# Patient Record
Sex: MALE | Race: WHITE | NOT HISPANIC OR LATINO
[De-identification: names, ages, dates, MRNs, and addresses within clinical notes are randomized per-mention and may not be internally consistent; named-entity substitution may affect disease eponyms.]

---

## 2020-04-25 ENCOUNTER — MESSAGE (OUTPATIENT)
Age: 55
End: 2020-04-25

## 2020-05-01 LAB
SARS-COV-2 IGG SERPL IA-ACNC: 1.1 INDEX
SARS-COV-2 IGG SERPL QL IA: POSITIVE

## 2020-09-23 ENCOUNTER — TRANSCRIPTION ENCOUNTER (OUTPATIENT)
Age: 55
End: 2020-09-23

## 2020-12-04 ENCOUNTER — OUTPATIENT (OUTPATIENT)
Dept: OUTPATIENT SERVICES | Facility: HOSPITAL | Age: 55
LOS: 1 days | End: 2020-12-04
Payer: COMMERCIAL

## 2020-12-04 DIAGNOSIS — Z11.59 ENCOUNTER FOR SCREENING FOR OTHER VIRAL DISEASES: ICD-10-CM

## 2020-12-04 LAB — SARS-COV-2 RNA SPEC QL NAA+PROBE: SIGNIFICANT CHANGE UP

## 2020-12-04 PROCEDURE — U0003: CPT

## 2020-12-05 DIAGNOSIS — Z11.59 ENCOUNTER FOR SCREENING FOR OTHER VIRAL DISEASES: ICD-10-CM

## 2021-06-29 ENCOUNTER — OUTPATIENT (OUTPATIENT)
Dept: OUTPATIENT SERVICES | Facility: HOSPITAL | Age: 56
LOS: 1 days | Discharge: HOME | End: 2021-06-29
Payer: COMMERCIAL

## 2021-06-29 ENCOUNTER — RESULT REVIEW (OUTPATIENT)
Age: 56
End: 2021-06-29

## 2021-06-29 VITALS
WEIGHT: 315 LBS | TEMPERATURE: 97 F | HEIGHT: 71 IN | HEART RATE: 76 BPM | DIASTOLIC BLOOD PRESSURE: 77 MMHG | OXYGEN SATURATION: 100 % | SYSTOLIC BLOOD PRESSURE: 138 MMHG | RESPIRATION RATE: 20 BRPM

## 2021-06-29 DIAGNOSIS — Z86.010 PERSONAL HISTORY OF COLONIC POLYPS: ICD-10-CM

## 2021-06-29 PROCEDURE — 88305 TISSUE EXAM BY PATHOLOGIST: CPT | Mod: 26

## 2021-06-29 PROCEDURE — 88305 TISSUE EXAM BY PATHOLOGIST: CPT

## 2021-07-05 DIAGNOSIS — D12.2 BENIGN NEOPLASM OF ASCENDING COLON: ICD-10-CM

## 2021-07-05 DIAGNOSIS — Z86.010 PERSONAL HISTORY OF COLONIC POLYPS: ICD-10-CM

## 2021-07-05 DIAGNOSIS — Z12.11 ENCOUNTER FOR SCREENING FOR MALIGNANT NEOPLASM OF COLON: ICD-10-CM

## 2021-07-05 DIAGNOSIS — E66.01 MORBID (SEVERE) OBESITY DUE TO EXCESS CALORIES: ICD-10-CM

## 2021-07-05 DIAGNOSIS — Z80.0 FAMILY HISTORY OF MALIGNANT NEOPLASM OF DIGESTIVE ORGANS: ICD-10-CM

## 2021-12-24 ENCOUNTER — TRANSCRIPTION ENCOUNTER (OUTPATIENT)
Age: 56
End: 2021-12-24

## 2022-01-12 ENCOUNTER — TRANSCRIPTION ENCOUNTER (OUTPATIENT)
Age: 57
End: 2022-01-12

## 2023-11-06 ENCOUNTER — OFFICE (OUTPATIENT)
Dept: URBAN - METROPOLITAN AREA CLINIC 102 | Facility: CLINIC | Age: 58
Setting detail: OPHTHALMOLOGY
End: 2023-11-06
Payer: COMMERCIAL

## 2023-11-06 DIAGNOSIS — H25.13: ICD-10-CM

## 2023-11-06 DIAGNOSIS — H40.023: ICD-10-CM

## 2023-11-06 DIAGNOSIS — H43.393: ICD-10-CM

## 2023-11-06 DIAGNOSIS — H17.9: ICD-10-CM

## 2023-11-06 DIAGNOSIS — H01.001: ICD-10-CM

## 2023-11-06 DIAGNOSIS — H01.002: ICD-10-CM

## 2023-11-06 DIAGNOSIS — H01.005: ICD-10-CM

## 2023-11-06 DIAGNOSIS — H01.004: ICD-10-CM

## 2023-11-06 PROCEDURE — 92004 COMPRE OPH EXAM NEW PT 1/>: CPT | Performed by: OPHTHALMOLOGY

## 2023-11-06 PROCEDURE — 92201 OPSCPY EXTND RTA DRAW UNI/BI: CPT | Performed by: OPHTHALMOLOGY

## 2023-11-06 PROCEDURE — 92133 CPTRZD OPH DX IMG PST SGM ON: CPT | Performed by: OPHTHALMOLOGY

## 2023-11-06 ASSESSMENT — REFRACTION_AUTOREFRACTION
OD_AXIS: 045
OD_CYLINDER: -0.50
OD_SPHERE: -1.25
OS_CYLINDER: -0.25
OS_AXIS: 156
OS_SPHERE: +0.75

## 2023-11-06 ASSESSMENT — LID EXAM ASSESSMENTS
OD_BLEPHARITIS: T
OS_BLEPHARITIS: T

## 2023-11-06 ASSESSMENT — CONFRONTATIONAL VISUAL FIELD TEST (CVF)
OS_FINDINGS: FULL
OD_FINDINGS: FULL

## 2023-11-06 ASSESSMENT — CORNEAL SURGICAL SCARRING: OS_SCARRING: MID

## 2023-11-06 ASSESSMENT — SPHEQUIV_DERIVED
OS_SPHEQUIV: 0.625
OD_SPHEQUIV: -1.5

## 2023-12-11 ENCOUNTER — OFFICE (OUTPATIENT)
Dept: URBAN - METROPOLITAN AREA CLINIC 109 | Facility: CLINIC | Age: 58
Setting detail: OPHTHALMOLOGY
End: 2023-12-11
Payer: COMMERCIAL

## 2023-12-11 DIAGNOSIS — H43.393: ICD-10-CM

## 2023-12-11 DIAGNOSIS — H01.004: ICD-10-CM

## 2023-12-11 DIAGNOSIS — H40.023: ICD-10-CM

## 2023-12-11 DIAGNOSIS — H17.9: ICD-10-CM

## 2023-12-11 DIAGNOSIS — H01.002: ICD-10-CM

## 2023-12-11 DIAGNOSIS — H25.13: ICD-10-CM

## 2023-12-11 DIAGNOSIS — H01.001: ICD-10-CM

## 2023-12-11 DIAGNOSIS — H01.005: ICD-10-CM

## 2023-12-11 PROCEDURE — 92012 INTRM OPH EXAM EST PATIENT: CPT | Performed by: OPHTHALMOLOGY

## 2023-12-11 PROCEDURE — 76514 ECHO EXAM OF EYE THICKNESS: CPT | Performed by: OPHTHALMOLOGY

## 2023-12-11 PROCEDURE — 92020 GONIOSCOPY: CPT | Performed by: OPHTHALMOLOGY

## 2023-12-11 PROCEDURE — 92250 FUNDUS PHOTOGRAPHY W/I&R: CPT | Performed by: OPHTHALMOLOGY

## 2023-12-11 PROCEDURE — 92083 EXTENDED VISUAL FIELD XM: CPT | Performed by: OPHTHALMOLOGY

## 2023-12-11 ASSESSMENT — CORNEAL SURGICAL SCARRING: OS_SCARRING: MID

## 2023-12-11 ASSESSMENT — CONFRONTATIONAL VISUAL FIELD TEST (CVF)
OS_FINDINGS: FULL
OD_FINDINGS: FULL

## 2023-12-11 ASSESSMENT — SPHEQUIV_DERIVED
OD_SPHEQUIV: -1.375
OS_SPHEQUIV: 0.625

## 2023-12-11 ASSESSMENT — LID EXAM ASSESSMENTS
OD_BLEPHARITIS: T
OS_BLEPHARITIS: T

## 2023-12-11 ASSESSMENT — REFRACTION_AUTOREFRACTION
OS_SPHERE: +0.75
OS_AXIS: 150
OS_CYLINDER: -0.25
OD_CYLINDER: -0.25
OD_AXIS: 15
OD_SPHERE: -1.25

## 2023-12-21 ENCOUNTER — TRANSCRIPTION ENCOUNTER (OUTPATIENT)
Age: 58
End: 2023-12-21

## 2024-01-23 ENCOUNTER — TRANSCRIPTION ENCOUNTER (OUTPATIENT)
Age: 59
End: 2024-01-23

## 2024-02-23 ENCOUNTER — TRANSCRIPTION ENCOUNTER (OUTPATIENT)
Age: 59
End: 2024-02-23

## 2024-03-25 ENCOUNTER — TRANSCRIPTION ENCOUNTER (OUTPATIENT)
Age: 59
End: 2024-03-25

## 2024-04-25 ENCOUNTER — TRANSCRIPTION ENCOUNTER (OUTPATIENT)
Age: 59
End: 2024-04-25

## 2024-05-28 ENCOUNTER — TRANSCRIPTION ENCOUNTER (OUTPATIENT)
Age: 59
End: 2024-05-28

## 2024-07-02 ENCOUNTER — TRANSCRIPTION ENCOUNTER (OUTPATIENT)
Age: 59
End: 2024-07-02

## 2024-07-15 ENCOUNTER — OFFICE (OUTPATIENT)
Dept: URBAN - METROPOLITAN AREA CLINIC 102 | Facility: CLINIC | Age: 59
Setting detail: OPHTHALMOLOGY
End: 2024-07-15
Payer: COMMERCIAL

## 2024-07-15 DIAGNOSIS — H01.005: ICD-10-CM

## 2024-07-15 DIAGNOSIS — H52.4: ICD-10-CM

## 2024-07-15 DIAGNOSIS — H43.393: ICD-10-CM

## 2024-07-15 DIAGNOSIS — H17.9: ICD-10-CM

## 2024-07-15 DIAGNOSIS — H25.13: ICD-10-CM

## 2024-07-15 DIAGNOSIS — H01.004: ICD-10-CM

## 2024-07-15 DIAGNOSIS — H01.001: ICD-10-CM

## 2024-07-15 DIAGNOSIS — H01.002: ICD-10-CM

## 2024-07-15 DIAGNOSIS — H40.023: ICD-10-CM

## 2024-07-15 PROBLEM — H52.7 REFRACTIVE ERROR: Status: ACTIVE | Noted: 2024-07-15

## 2024-07-15 PROCEDURE — 92014 COMPRE OPH EXAM EST PT 1/>: CPT | Performed by: OPHTHALMOLOGY

## 2024-07-15 PROCEDURE — 92083 EXTENDED VISUAL FIELD XM: CPT | Performed by: OPHTHALMOLOGY

## 2024-07-15 PROCEDURE — 92133 CPTRZD OPH DX IMG PST SGM ON: CPT | Performed by: OPHTHALMOLOGY

## 2024-07-15 PROCEDURE — 92015 DETERMINE REFRACTIVE STATE: CPT | Performed by: OPHTHALMOLOGY

## 2024-07-15 ASSESSMENT — LID EXAM ASSESSMENTS
OD_BLEPHARITIS: RLL RUL T
OS_BLEPHARITIS: LLL LUL T

## 2024-07-15 ASSESSMENT — CONFRONTATIONAL VISUAL FIELD TEST (CVF)
OD_FINDINGS: FULL
OS_FINDINGS: FULL

## 2024-08-23 ENCOUNTER — INPATIENT (INPATIENT)
Facility: HOSPITAL | Age: 59
LOS: 0 days | Discharge: HOME | DRG: 712 | End: 2024-08-24
Attending: STUDENT IN AN ORGANIZED HEALTH CARE EDUCATION/TRAINING PROGRAM | Admitting: STUDENT IN AN ORGANIZED HEALTH CARE EDUCATION/TRAINING PROGRAM
Payer: COMMERCIAL

## 2024-08-23 VITALS — WEIGHT: 309.97 LBS | HEIGHT: 70 IN

## 2024-08-23 PROCEDURE — 99053 MED SERV 10PM-8AM 24 HR FAC: CPT

## 2024-08-23 PROCEDURE — 99285 EMERGENCY DEPT VISIT HI MDM: CPT

## 2024-08-23 RX ORDER — SODIUM CHLORIDE 9 MG/ML
1000 INJECTION INTRAMUSCULAR; INTRAVENOUS; SUBCUTANEOUS ONCE
Refills: 0 | Status: COMPLETED | OUTPATIENT
Start: 2024-08-23 | End: 2024-08-23

## 2024-08-23 RX ADMIN — SODIUM CHLORIDE 1000 MILLILITER(S): 9 INJECTION INTRAMUSCULAR; INTRAVENOUS; SUBCUTANEOUS at 23:59

## 2024-08-23 NOTE — ED PROVIDER NOTE - OBJECTIVE STATEMENT
Pt is a 58y male w/ a h/o colon polyps who presents to the ED c/o R sided testicular pain. Patient states he woke up this AM around 2 to 3am from jabbing testicular pain radiating up the groin. Notes that he reported to work but the pain was slowly worsening throughout the day. Took Advil w/ initial improvement of symptoms, but then worsened again spurring a visit to the ED. Is sexually active w/ one partner, denies h/o STDs. Denies trauma or aggravating activity. Denies urinary symptoms. NKA.

## 2024-08-23 NOTE — ED PROVIDER NOTE - PHYSICAL EXAMINATION
GENERAL: A&Ox4, non-toxic appearing, no acute distress  HEENT: NCAT, EOMI, oral mucosa moist, normal conjunctiva  RESP: no respiratory distress, speaking in full sentences  CV: RRR  MSK: no visible deformities  : R testicular TTP w/o significant swelling. Normal cremasteric reflex. No penile discharge.  NEURO: no focal sensory or motor deficits, CN 2-12 grossly intact  SKIN: warm, normal color, well perfused, no rash  PSYCH: normal affect

## 2024-08-23 NOTE — ED PROVIDER NOTE - PROGRESS NOTE DETAILS
Called by radiology, patient with lack of blood flow to the right testicle concerning for testicular torsion.   paged urology PA, discussed these findings with parent.  Dr. Dillard on-call for urology, paged through his service and awaiting callback. Patient accepted for admission under urology service.  Additional preop labs, chest x-ray, and EKG ordered.

## 2024-08-23 NOTE — ED PROVIDER NOTE - CLINICAL SUMMARY MEDICAL DECISION MAKING FREE TEXT BOX
58y p/w gradual onset, atraumatic R testicular pain. DDx includes but is not limited to; epididymitis vs UTI, less likely torsion. Plan for labs, urine, and US.

## 2024-08-23 NOTE — ED ADULT TRIAGE NOTE - CHIEF COMPLAINT QUOTE
Pt presents complaining of R sided testicular pain onset x12hrs. States pain is 4/10 during time of triage. Denies urinary symptoms, is sexually active with one partner. Denies allergies.

## 2024-08-24 ENCOUNTER — TRANSCRIPTION ENCOUNTER (OUTPATIENT)
Age: 59
End: 2024-08-24

## 2024-08-24 VITALS — OXYGEN SATURATION: 97 % | DIASTOLIC BLOOD PRESSURE: 54 MMHG | SYSTOLIC BLOOD PRESSURE: 110 MMHG

## 2024-08-24 DIAGNOSIS — N44.00 TORSION OF TESTIS, UNSPECIFIED: ICD-10-CM

## 2024-08-24 PROBLEM — K63.5 POLYP OF COLON: Chronic | Status: ACTIVE | Noted: 2021-06-29

## 2024-08-24 LAB
ABO RH CONFIRMATION: SIGNIFICANT CHANGE UP
ALBUMIN SERPL ELPH-MCNC: 3.5 G/DL — SIGNIFICANT CHANGE UP (ref 3.3–5)
ALP SERPL-CCNC: 60 U/L — SIGNIFICANT CHANGE UP (ref 40–120)
ALT FLD-CCNC: 40 U/L — SIGNIFICANT CHANGE UP (ref 12–78)
ANION GAP SERPL CALC-SCNC: 8 MMOL/L — SIGNIFICANT CHANGE UP (ref 5–17)
APTT BLD: 29.1 SEC — SIGNIFICANT CHANGE UP (ref 24.5–35.6)
AST SERPL-CCNC: 22 U/L — SIGNIFICANT CHANGE UP (ref 15–37)
BASOPHILS # BLD AUTO: 0.04 K/UL — SIGNIFICANT CHANGE UP (ref 0–0.2)
BASOPHILS NFR BLD AUTO: 0.3 % — SIGNIFICANT CHANGE UP (ref 0–2)
BILIRUB SERPL-MCNC: 1 MG/DL — SIGNIFICANT CHANGE UP (ref 0.2–1.2)
BLD GP AB SCN SERPL QL: SIGNIFICANT CHANGE UP
BUN SERPL-MCNC: 14 MG/DL — SIGNIFICANT CHANGE UP (ref 7–23)
CALCIUM SERPL-MCNC: 8.9 MG/DL — SIGNIFICANT CHANGE UP (ref 8.5–10.1)
CHLORIDE SERPL-SCNC: 107 MMOL/L — SIGNIFICANT CHANGE UP (ref 96–108)
CO2 SERPL-SCNC: 25 MMOL/L — SIGNIFICANT CHANGE UP (ref 22–31)
CREAT SERPL-MCNC: 0.92 MG/DL — SIGNIFICANT CHANGE UP (ref 0.5–1.3)
EGFR: 95 ML/MIN/1.73M2 — SIGNIFICANT CHANGE UP
EOSINOPHIL # BLD AUTO: 0.16 K/UL — SIGNIFICANT CHANGE UP (ref 0–0.5)
EOSINOPHIL NFR BLD AUTO: 1.4 % — SIGNIFICANT CHANGE UP (ref 0–6)
GLUCOSE SERPL-MCNC: 99 MG/DL — SIGNIFICANT CHANGE UP (ref 70–99)
HCT VFR BLD CALC: 43.1 % — SIGNIFICANT CHANGE UP (ref 39–50)
HGB BLD-MCNC: 14.1 G/DL — SIGNIFICANT CHANGE UP (ref 13–17)
IMM GRANULOCYTES NFR BLD AUTO: 0.5 % — SIGNIFICANT CHANGE UP (ref 0–0.9)
INR BLD: 1.1 RATIO — SIGNIFICANT CHANGE UP (ref 0.85–1.18)
LYMPHOCYTES # BLD AUTO: 1.17 K/UL — SIGNIFICANT CHANGE UP (ref 1–3.3)
LYMPHOCYTES # BLD AUTO: 10.1 % — LOW (ref 13–44)
MCHC RBC-ENTMCNC: 25.5 PG — LOW (ref 27–34)
MCHC RBC-ENTMCNC: 32.7 GM/DL — SIGNIFICANT CHANGE UP (ref 32–36)
MCV RBC AUTO: 77.8 FL — LOW (ref 80–100)
MONOCYTES # BLD AUTO: 1.28 K/UL — HIGH (ref 0–0.9)
MONOCYTES NFR BLD AUTO: 11.1 % — SIGNIFICANT CHANGE UP (ref 2–14)
NEUTROPHILS # BLD AUTO: 8.85 K/UL — HIGH (ref 1.8–7.4)
NEUTROPHILS NFR BLD AUTO: 76.6 % — SIGNIFICANT CHANGE UP (ref 43–77)
PLATELET # BLD AUTO: 251 K/UL — SIGNIFICANT CHANGE UP (ref 150–400)
POTASSIUM SERPL-MCNC: 3.6 MMOL/L — SIGNIFICANT CHANGE UP (ref 3.5–5.3)
POTASSIUM SERPL-SCNC: 3.6 MMOL/L — SIGNIFICANT CHANGE UP (ref 3.5–5.3)
PROT SERPL-MCNC: 7.6 GM/DL — SIGNIFICANT CHANGE UP (ref 6–8.3)
PROTHROM AB SERPL-ACNC: 12.4 SEC — SIGNIFICANT CHANGE UP (ref 9.5–13)
RBC # BLD: 5.54 M/UL — SIGNIFICANT CHANGE UP (ref 4.2–5.8)
RBC # FLD: 15.3 % — HIGH (ref 10.3–14.5)
SODIUM SERPL-SCNC: 140 MMOL/L — SIGNIFICANT CHANGE UP (ref 135–145)
WBC # BLD: 11.56 K/UL — HIGH (ref 3.8–10.5)
WBC # FLD AUTO: 11.56 K/UL — HIGH (ref 3.8–10.5)

## 2024-08-24 PROCEDURE — 76870 US EXAM SCROTUM: CPT | Mod: 26

## 2024-08-24 PROCEDURE — 36415 COLL VENOUS BLD VENIPUNCTURE: CPT

## 2024-08-24 PROCEDURE — 85610 PROTHROMBIN TIME: CPT

## 2024-08-24 PROCEDURE — 54520 REMOVAL OF TESTIS: CPT | Mod: RT

## 2024-08-24 PROCEDURE — 93010 ELECTROCARDIOGRAM REPORT: CPT

## 2024-08-24 PROCEDURE — 54600 REDUCE TESTIS TORSION: CPT | Mod: RT

## 2024-08-24 PROCEDURE — 88305 TISSUE EXAM BY PATHOLOGIST: CPT

## 2024-08-24 PROCEDURE — 86901 BLOOD TYPING SEROLOGIC RH(D): CPT

## 2024-08-24 PROCEDURE — 85730 THROMBOPLASTIN TIME PARTIAL: CPT

## 2024-08-24 PROCEDURE — 71045 X-RAY EXAM CHEST 1 VIEW: CPT | Mod: 26

## 2024-08-24 PROCEDURE — 93975 VASCULAR STUDY: CPT | Mod: 26

## 2024-08-24 PROCEDURE — 86900 BLOOD TYPING SEROLOGIC ABO: CPT

## 2024-08-24 PROCEDURE — 88305 TISSUE EXAM BY PATHOLOGIST: CPT | Mod: 26

## 2024-08-24 PROCEDURE — 71045 X-RAY EXAM CHEST 1 VIEW: CPT

## 2024-08-24 PROCEDURE — 86850 RBC ANTIBODY SCREEN: CPT

## 2024-08-24 PROCEDURE — 93005 ELECTROCARDIOGRAM TRACING: CPT

## 2024-08-24 RX ORDER — ACETAMINOPHEN 325 MG/1
1000 TABLET ORAL ONCE
Refills: 0 | Status: COMPLETED | OUTPATIENT
Start: 2024-08-24 | End: 2024-08-24

## 2024-08-24 RX ORDER — ACETAMINOPHEN 325 MG/1
650 TABLET ORAL EVERY 6 HOURS
Refills: 0 | Status: DISCONTINUED | OUTPATIENT
Start: 2024-08-24 | End: 2024-08-24

## 2024-08-24 RX ORDER — ONDANSETRON 2 MG/ML
4 INJECTION, SOLUTION INTRAMUSCULAR; INTRAVENOUS THREE TIMES A DAY
Refills: 0 | Status: DISCONTINUED | OUTPATIENT
Start: 2024-08-24 | End: 2024-08-24

## 2024-08-24 RX ORDER — ROSUVASTATIN CALCIUM 10 MG/1
1 TABLET ORAL
Refills: 0 | DISCHARGE

## 2024-08-24 RX ORDER — BACITRACIN 500 UNIT/G
1 OINTMENT (GRAM) TOPICAL
Qty: 1 | Refills: 0
Start: 2024-08-24 | End: 2024-08-28

## 2024-08-24 RX ORDER — TIRZEPATIDE 5 MG/.5ML
10 INJECTION, SOLUTION SUBCUTANEOUS
Refills: 0 | DISCHARGE

## 2024-08-24 RX ORDER — HYDROMORPHONE HYDROCHLORIDE 2 MG/1
1 TABLET ORAL EVERY 4 HOURS
Refills: 0 | Status: DISCONTINUED | OUTPATIENT
Start: 2024-08-24 | End: 2024-08-24

## 2024-08-24 RX ORDER — OMEPRAZOLE 40 MG/1
20 CAPSULE, DELAYED RELEASE ORAL
Refills: 0 | DISCHARGE

## 2024-08-24 RX ORDER — PANTOPRAZOLE SODIUM 40 MG
40 TABLET, DELAYED RELEASE (ENTERIC COATED) ORAL
Refills: 0 | Status: DISCONTINUED | OUTPATIENT
Start: 2024-08-24 | End: 2024-08-24

## 2024-08-24 RX ORDER — HYDROMORPHONE HYDROCHLORIDE 2 MG/1
0.5 TABLET ORAL
Refills: 0 | Status: DISCONTINUED | OUTPATIENT
Start: 2024-08-24 | End: 2024-08-24

## 2024-08-24 RX ORDER — TADALAFIL 20 MG/1
1 TABLET, FILM COATED ORAL
Refills: 0 | DISCHARGE

## 2024-08-24 RX ORDER — OXYCODONE AND ACETAMINOPHEN 7.5; 325 MG/1; MG/1
2 TABLET ORAL EVERY 4 HOURS
Refills: 0 | Status: DISCONTINUED | OUTPATIENT
Start: 2024-08-24 | End: 2024-08-24

## 2024-08-24 RX ORDER — SODIUM CHLORIDE 9 MG/ML
1000 INJECTION INTRAMUSCULAR; INTRAVENOUS; SUBCUTANEOUS
Refills: 0 | Status: DISCONTINUED | OUTPATIENT
Start: 2024-08-24 | End: 2024-08-24

## 2024-08-24 RX ORDER — TRAMADOL HYDROCHLORIDE 200 MG/1
1 TABLET, EXTENDED RELEASE ORAL
Qty: 12 | Refills: 0
Start: 2024-08-24 | End: 2024-08-26

## 2024-08-24 RX ADMIN — Medication 40 MILLIGRAM(S): at 05:22

## 2024-08-24 RX ADMIN — HYDROMORPHONE HYDROCHLORIDE 0.5 MILLIGRAM(S): 2 TABLET ORAL at 04:30

## 2024-08-24 RX ADMIN — HYDROMORPHONE HYDROCHLORIDE 0.5 MILLIGRAM(S): 2 TABLET ORAL at 04:10

## 2024-08-24 RX ADMIN — ACETAMINOPHEN 650 MILLIGRAM(S): 325 TABLET ORAL at 08:52

## 2024-08-24 RX ADMIN — ACETAMINOPHEN 400 MILLIGRAM(S): 325 TABLET ORAL at 00:49

## 2024-08-24 NOTE — ED ADULT NURSE NOTE - NSFALLUNIVINTERV_ED_ALL_ED
Bed/Stretcher in lowest position, wheels locked, appropriate side rails in place/Call bell, personal items and telephone in reach/Instruct patient to call for assistance before getting out of bed/chair/stretcher/Non-slip footwear applied when patient is off stretcher/[place] to call system/Physically safe environment - no spills, clutter or unnecessary equipment/Purposeful proactive rounding/Room/bathroom lighting operational, light cord in reach

## 2024-08-24 NOTE — DISCHARGE NOTE PROVIDER - NSTOBACCOUSAGEY/N_GEN_A_CS
----- Message from Diana Mcnair MD sent at 8/23/2017  6:22 AM CDT -----  Dear Selam,    The radiologist has reviewed your ultrasound of leg. Your ultrasound showed that your circulation of blood in the leg is normal. So blackening of toe is not due to circulation issues. If symptoms continue then I would recommend to see podiatry for further recommendations.      We appreciate your confidence in our office.  If you have additional questions, please call at your convenience.    Sincerely,      Diana Mcnair M.D.  Family Practice  [facility]  [address] [place] [address]  T [phone]  F [phone]      
Left message for patient to call back at 9:34 am today.  Patient has two messages, this message and another regarding blood pressure in inbox.  Awaiting return call.  
Returning call    
Returning call    
Spoke with patient, updated on results, no questions or concerns at this time regarding results.  
No

## 2024-08-24 NOTE — BRIEF OPERATIVE NOTE - OPERATION/FINDINGS
Scrotal exploration, detorsion of RIGHT testicle, RIGHT orchiectomy, LEFT orchiopexy. RIGHT testicle with intravaginal torsion, cyanotic and not viable even after detorsion. normal LEFT testicle.

## 2024-08-24 NOTE — PROGRESS NOTE ADULT - SUBJECTIVE AND OBJECTIVE BOX
Pt seen and examined at bedside. no acute post op events. Tolerating diet. Pain on RIGHT improved, mild pain on LEFT.     Vital Signs Last 24 Hrs  T(C): 36.7 (24 Aug 2024 09:13), Max: 36.9 (24 Aug 2024 03:51)  T(F): 98 (24 Aug 2024 09:13), Max: 98.4 (24 Aug 2024 03:51)  HR: 82 (24 Aug 2024 08:14) (65 - 90)  BP: 110/54 (24 Aug 2024 10:00) (110/54 - 155/64)  BP(mean): 71 (24 Aug 2024 10:00) (71 - 89)  RR: 20 (24 Aug 2024 06:00) (10 - 20)  SpO2: 97% (24 Aug 2024 10:00) (92% - 100%)    Parameters below as of 24 Aug 2024 10:00  Patient On (Oxygen Delivery Method): room air    NAD, A+Ox3  MMM  EOMI  no increased WOB  ABD S NT ND                          14.1   11.56 )-----------( 251      ( 23 Aug 2024 23:54 )             43.1   08-23    140  |  107  |  14  ----------------------------<  99  3.6   |  25  |  0.92    Ca    8.9      23 Aug 2024 23:54    TPro  7.6  /  Alb  3.5  /  TBili  1.0  /  DBili  x   /  AST  22  /  ALT  40  /  AlkPhos  60  08-23

## 2024-08-24 NOTE — DISCHARGE NOTE PROVIDER - NSDCCPCAREPLAN_GEN_ALL_CORE_FT
PRINCIPAL DISCHARGE DIAGNOSIS  Diagnosis: Right testicular torsion  Assessment and Plan of Treatment:

## 2024-08-24 NOTE — ED ADULT NURSE NOTE - AVIAN FLU WORK
Otalgia  (Ear Pain L)  Acetaminophen, ibuprofen or continue Alleve per package insert for pain/fever.  Warm compress to affected ear/s (10-15 minutes periodically throughout the day) If using a heating pad do not sleep on it and use on low setting    Keep water out of ears for 7 days  Ear plugs when in the water/bath/shower.  Avoid use of q-tips   Nothing smaller than your finger should be placed in ears  Increase fluid intake (2-3 L/day)   If symptoms worsen- increased fever (102 degrees or greater), severe ear pain, drainage from ear/s, loss of hearing, dizziness, vomiting, nausea, neck pain/stiffness, difficulty swallowing- GO IMMEDIATELY TO ER OR URGENT CARE!      Insect Bites  May take over-the-counter Acetaminophen, Ibuprofen or continue Alleve for fever or pain per package instructions.  Gentle washing with mild moisturizing soap and pat dry.   Cool compresses for swelling ([place]’s solution 10 mins 3- 4 times/day  Elevate affected area  Can apply topical gels, creams and lotions for itching (Calamine, Sarna)  Zyrtec, Benadryl or antihistamine of choice as needed per pkg instructions for itching  Avoid applying topical antihistamines or pain relievers (increase allergic contact sensitivity)   If affected area worsens, difficulty moving affected area, increased redness and/or swelling, warm or hot to touch, pain, pus with foul odor, difficulty breathing, chest pain, difficulty swallowing, or fevers greater than 101.5 go to ER/UC IMMEDIATELY    Pt counseled to f/u with Psychiatrist regarding intermittent episodes of dizziness following start of Cymbalta    Pt verbalizes understanding of  discharge instructions and counseled to contact [phone] for questions/concerns regarding this visit  
No

## 2024-08-24 NOTE — BRIEF OPERATIVE NOTE - NSICDXBRIEFPROCEDURE_GEN_ALL_CORE_FT
PROCEDURES:  Orchiopexy with detorsion of testis 24-Aug-2024 03:54:16  Kwan Dillard  Right orchiectomy 24-Aug-2024 03:55:14  Kwan Dillard  Left orchiopexy by scrotal approach 24-Aug-2024 03:55:52  Kwan Dillard

## 2024-08-24 NOTE — H&P ADULT - HISTORY OF PRESENT ILLNESS
57 yo male with complaints of testicular pain on right worsening over last 24 hours.  States that anytime it is manipulated pain worsens.  Denies D/C, fever, N/V.

## 2024-08-24 NOTE — DISCHARGE NOTE PROVIDER - NSDCMRMEDTOKEN_GEN_ALL_CORE_FT
bacitracin 500 units/g topical ointment: Apply topically to affected area 3 times a day  Omeprazole: 20 milligram(s) once a day  rosuvastatin 5 mg oral tablet: 1 tab(s) orally once a day  tadalafil 5 mg oral tablet: 1 tab(s) orally once a day  tirzepatide 10 mg/0.5 mL subcutaneous solution: 10 milligram(s) subcutaneously once a week wednesdays  traMADol 50 mg oral tablet: 1 tab(s) orally 4 times a day as needed for  severe pain MDD: 4 tabs

## 2024-08-24 NOTE — H&P ADULT - NSHPPHYSICALEXAM_GEN_ALL_CORE
CONSTITUTIONAL: NAD, well-groomed, well-developed  HEAD:  Atraumatic, Normocephalic  EYES: EOMI, PERRLA, conjunctiva and sclera clear  ENMT: No tonsillar erythema, exudates, or enlargement; Moist mucous membranes, Good dentition, No lesions  NECK: Supple, No JVD, Normal thyroid  NERVOUS SYSTEM:  Alert & Oriented X3, Good concentration; Motor Strength 5/5 B/L upper and lower extremities; DTRs 2+ intact and symmetric  CHEST/LUNG: Clear to percussion bilaterally; No rales, rhonchi, wheezing, or rubs  HEART: Regular rate and rhythm; No murmurs, rubs, or gallops  ABDOMEN: Soft, Nontender, Nondistended; Bowel sounds present  EXTREMITIES:  2+ Peripheral Pulses, No clubbing, cyanosis, or edema  LYMPH: No lymphadenopathy noted  SKIN: No rashes or lesions  Gu: tenderness to palpation of right testicle , no discharge

## 2024-08-24 NOTE — H&P ADULT - NSICDXPASTMEDICALHX_GEN_ALL_CORE_FT
PAST MEDICAL HISTORY:  Colon polyps     GERD (gastroesophageal reflux disease)     High cholesterol

## 2024-08-24 NOTE — PROGRESS NOTE ADULT - ASSESSMENT
57 yo M s/p scrotal exploration, RIGHT orchiectomy, LEFT orchiopexy for RIGHT testicular torsion. OK for discharge

## 2024-08-24 NOTE — DISCHARGE NOTE PROVIDER - CARE PROVIDER_API CALL
Kwan Dillard Farooq  Urology  [address]  [place] [address]  Phone: [phone]  Fax: [phone]  Follow Up Time: 1 week

## 2024-08-24 NOTE — H&P ADULT - NSHPREVIEWOFSYSTEMS_GEN_ALL_CORE
REVIEW OF SYSTEMS:    CONSTITUTIONAL: No fever, weight loss, or fatigue  EYES: No eye pain, visual disturbances, or discharge  ENMT:  No difficulty hearing, tinnitus, vertigo; No sinus or throat pain  NECK: No pain or stiffness  BREASTS: No pain, masses, or nipple discharge  RESPIRATORY: No cough, wheezing, chills or hemoptysis; No shortness of breath  CARDIOVASCULAR: No chest pain, palpitations, dizziness, or leg swelling  GASTROINTESTINAL: No abdominal or epigastric pain. No nausea, vomiting, or hematemesis; No diarrhea or constipation. No melena or hematochezia.  GENITOURINARY:see hpi  NEUROLOGICAL: No headaches, memory loss, loss of strength, numbness, or tremors  SKIN: No itching, burning, rashes, or lesions   LYMPH NODES: No enlarged glands  ENDOCRINE: No heat or cold intolerance; No hair loss  MUSCULOSKELETAL: No joint pain or swelling; No muscle, back, or extremity pain  PSYCHIATRIC: No depression, anxiety, mood swings, or difficulty sleeping  HEME/LYMPH: No easy bruising, or bleeding gums  ALLERY AND IMMUNOLOGIC: No hives or eczema

## 2024-08-24 NOTE — ED ADULT NURSE NOTE - NS ED NURSE REPORT GIVEN TO FT
From: Nat Muniz     Sent: 10/21/2019  2:10 PM CDT       To: Scheduling Office Staff  Subject: RE: Appointment Request ()    Appointment Request From: Nat Muniz    With Provider: Annia Garner MD [-Primary Care Physician-]    Preferred Date Range: Any date 10/21/2019 or later    Preferred Times: Any    Reason: To address the following health maintenance concerns.  Shingles Vaccine    Comments:  I called today to schedule Shingles vaccination and was told it is on back order and I will be called when new product arrives.     OR rn

## 2024-08-24 NOTE — DISCHARGE NOTE NURSING/CASE MANAGEMENT/SOCIAL WORK - PATIENT PORTAL LINK FT
You can access the FollowMyHealth Patient Portal offered by [facility] by registering at the following website: [email or web address]. By joining [facility]’s FollowMyHealth portal, you will also be able to view your health information using other applications (apps) compatible with our system.

## 2024-08-24 NOTE — DISCHARGE NOTE NURSING/CASE MANAGEMENT/SOCIAL WORK - NSDCPNINST_GEN_ALL_CORE
Follow up if any worsening pain or any pus like drainage, redness, swelling noted. Call MD or go to ED.

## 2024-08-24 NOTE — ED ADULT NURSE NOTE - NSICDXPASTMEDICALHX_GEN_ALL_CORE_FT
PAST MEDICAL HISTORY:  Colon polyps     GERD (gastroesophageal reflux disease)     High cholesterol      ---

## 2024-08-24 NOTE — ED ADULT NURSE NOTE - OBJECTIVE STATEMENT
59 yo male with complaints of testicular pain on right worsening over last 24 hours.  States that anytime it is manipulated pain worsens.  Denies D/C, fever, N/V.

## 2024-08-24 NOTE — DISCHARGE NOTE PROVIDER - HOSPITAL COURSE
HPI:  59 yo male with complaints of testicular pain on right worsening over last 24 hours.  States that anytime it is manipulated pain worsens.  Denies D/C, fever, N/V. (24 Aug 2024 01:23)    PAST MEDICAL HISTORY:  Colon polyps     GERD (gastroesophageal reflux disease)     High cholesterol.     PAST SURGICAL HISTORY:  No significant past surgical history.   Physical Exam: CONSTITUTIONAL: NAD, well-groomed, well-developed  HEAD:  Atraumatic, Normocephalic  EYES: EOMI, PERRLA, conjunctiva and sclera clear  ENMT: No tonsillar erythema, exudates, or enlargement; Moist mucous membranes, Good dentition, No lesions  NECK: Supple, No JVD, Normal thyroid  NERVOUS SYSTEM:  Alert & Oriented X3, Good concentration; Motor Strength 5/5 B/L upper and lower extremities; DTRs 2+ intact and symmetric  CHEST/LUNG: Clear to percussion bilaterally; No rales, rhonchi, wheezing, or rubs  HEART: Regular rate and rhythm; No murmurs, rubs, or gallops  ABDOMEN: Soft, Nontender, Nondistended; Bowel sounds present  EXTREMITIES:  2+ Peripheral Pulses, No clubbing, cyanosis, or edema  LYMPH: No lymphadenopathy noted  SKIN: No rashes or lesions  Gu: tenderness to palpation of right testicle , no discharge    US showed no flow to RIGHT testicle.  Pt taken emergently to OR. RIGHT testicle not viable and RIGHT orchiopexy was performed. LEFT orchiopexy.   POD#1, no complications. ok to discharge.

## 2024-08-26 PROBLEM — E78.00 PURE HYPERCHOLESTEROLEMIA, UNSPECIFIED: Chronic | Status: ACTIVE | Noted: 2024-08-24

## 2024-08-26 PROBLEM — K21.9 GASTRO-ESOPHAGEAL REFLUX DISEASE WITHOUT ESOPHAGITIS: Chronic | Status: ACTIVE | Noted: 2024-08-24

## 2024-08-27 ENCOUNTER — TRANSCRIPTION ENCOUNTER (OUTPATIENT)
Age: 59
End: 2024-08-27

## 2024-08-28 LAB — SURGICAL PATHOLOGY STUDY: SIGNIFICANT CHANGE UP

## 2024-08-29 ENCOUNTER — APPOINTMENT (OUTPATIENT)
Dept: UROLOGY | Facility: CLINIC | Age: 59
End: 2024-08-29
Payer: COMMERCIAL

## 2024-08-29 DIAGNOSIS — Z80.42 FAMILY HISTORY OF MALIGNANT NEOPLASM OF PROSTATE: ICD-10-CM

## 2024-08-29 DIAGNOSIS — Z78.9 OTHER SPECIFIED HEALTH STATUS: ICD-10-CM

## 2024-08-29 DIAGNOSIS — Z80.7 FAMILY HISTORY OF OTHER MALIGNANT NEOPLASMS OF LYMPHOID, HEMATOPOIETIC AND RELATED TISSUES: ICD-10-CM

## 2024-08-29 PROBLEM — N44.00 TESTICULAR TORSION: Status: ACTIVE | Noted: 2024-08-29

## 2024-08-29 PROCEDURE — 99024 POSTOP FOLLOW-UP VISIT: CPT

## 2024-08-29 RX ORDER — OMEPRAZOLE 20 MG/1
20 CAPSULE, DELAYED RELEASE ORAL
Refills: 0 | Status: ACTIVE | COMMUNITY

## 2024-08-29 RX ORDER — TADALAFIL 5 MG/1
5 TABLET ORAL
Refills: 0 | Status: ACTIVE | COMMUNITY

## 2024-08-29 RX ORDER — ROSUVASTATIN CALCIUM 5 MG/1
TABLET, FILM COATED ORAL
Refills: 0 | Status: ACTIVE | COMMUNITY

## 2024-08-29 RX ORDER — TIRZEPATIDE 10 MG/.5ML
10 INJECTION, SOLUTION SUBCUTANEOUS
Refills: 0 | Status: ACTIVE | COMMUNITY

## 2024-08-29 NOTE — PHYSICAL EXAM
[Normal Appearance] : normal appearance [Well Groomed] : well groomed [General Appearance - In No Acute Distress] : no acute distress [Edema] : no peripheral edema [Respiration, Rhythm And Depth] : normal respiratory rhythm and effort [Exaggerated Use Of Accessory Muscles For Inspiration] : no accessory muscle use [Abdomen Soft] : soft [Abdomen Tenderness] : non-tender [Costovertebral Angle Tenderness] : no ~M costovertebral angle tenderness [Urinary Bladder Findings] : the bladder was normal on palpation [Normal Station and Gait] : the gait and station were normal for the patient's age [] : no rash [No Focal Deficits] : no focal deficits [Oriented To Time, Place, And Person] : oriented to person, place, and time [Affect] : the affect was normal [Mood] : the mood was normal [No Palpable Adenopathy] : no palpable adenopathy [de-identified] : scrotal incisions CDI, normal LEFT testicle

## 2024-08-29 NOTE — ASSESSMENT
[FreeTextEntry1] : 57 yo m s/p RIGHT orchiectomy, LEFT orchiopexy for RIGHT testicular torsion. Doing well post op. Will assess serum T due to fatigue. Will continue annual prostate cancer screening. RTO in 6 months or sooner PRN.

## 2024-08-29 NOTE — HISTORY OF PRESENT ILLNESS
[FreeTextEntry1] : 58 year old man seen 08/29/2024 for follow up from RIGHT orchiectomy, LEFT orchiopexy for RIGHT testicular torsion. Doing well, mild pain, some induration. He reports fatigue, but no other complaints. He reports nocturia 1-3x/night, no RADHA or LE edema. Not bothered. He reports PSA by PCP has been about 2.5.

## 2024-08-29 NOTE — PHYSICAL EXAM
[Normal Appearance] : normal appearance [Well Groomed] : well groomed [General Appearance - In No Acute Distress] : no acute distress [Edema] : no peripheral edema [Respiration, Rhythm And Depth] : normal respiratory rhythm and effort [Exaggerated Use Of Accessory Muscles For Inspiration] : no accessory muscle use [Abdomen Soft] : soft [Abdomen Tenderness] : non-tender [Costovertebral Angle Tenderness] : no ~M costovertebral angle tenderness [Urinary Bladder Findings] : the bladder was normal on palpation [Normal Station and Gait] : the gait and station were normal for the patient's age [] : no rash [No Focal Deficits] : no focal deficits [Oriented To Time, Place, And Person] : oriented to person, place, and time [Affect] : the affect was normal [Mood] : the mood was normal [No Palpable Adenopathy] : no palpable adenopathy [de-identified] : scrotal incisions CDI, normal LEFT testicle

## 2024-08-29 NOTE — ASSESSMENT
[FreeTextEntry1] : 59 yo m s/p RIGHT orchiectomy, LEFT orchiopexy for RIGHT testicular torsion. Doing well post op. Will assess serum T due to fatigue. Will continue annual prostate cancer screening. RTO in 6 months or sooner PRN.

## 2024-08-31 DIAGNOSIS — K21.9 GASTRO-ESOPHAGEAL REFLUX DISEASE WITHOUT ESOPHAGITIS: ICD-10-CM

## 2024-08-31 DIAGNOSIS — E78.00 PURE HYPERCHOLESTEROLEMIA, UNSPECIFIED: ICD-10-CM

## 2024-08-31 DIAGNOSIS — N44.00 TORSION OF TESTIS, UNSPECIFIED: ICD-10-CM

## 2024-09-03 DIAGNOSIS — N44.00 TORSION OF TESTIS, UNSPECIFIED: ICD-10-CM

## 2024-09-03 PROBLEM — R79.89 LOW TESTOSTERONE IN MALE: Status: ACTIVE | Noted: 2024-09-03

## 2024-09-03 LAB
TESTOST FREE SERPL-MCNC: 2.2 PG/ML
TESTOST SERPL-MCNC: 161 NG/DL

## 2024-09-05 LAB
ESTRADIOL SERPL-MCNC: 28 PG/ML
HCT VFR BLD CALC: 44.6 %
HGB BLD-MCNC: 13.8 G/DL
MCHC RBC-ENTMCNC: 25.4 PG
MCHC RBC-ENTMCNC: 30.9 GM/DL
MCV RBC AUTO: 82.1 FL
PLATELET # BLD AUTO: 308 K/UL
PROLACTIN SERPL-MCNC: 13.1 NG/ML
PSA SERPL-MCNC: 4.85 NG/ML
RBC # BLD: 5.43 M/UL
RBC # FLD: 16 %
WBC # FLD AUTO: 8.35 K/UL

## 2024-09-06 LAB — LH SERPL-ACNC: 8.6 IU/L

## 2024-09-09 LAB
TESTOST FREE SERPL-MCNC: 1.6 PG/ML
TESTOST SERPL-MCNC: 217 NG/DL

## 2024-09-12 ENCOUNTER — APPOINTMENT (OUTPATIENT)
Dept: UROLOGY | Facility: CLINIC | Age: 59
End: 2024-09-12
Payer: COMMERCIAL

## 2024-09-12 DIAGNOSIS — R79.89 OTHER SPECIFIED ABNORMAL FINDINGS OF BLOOD CHEMISTRY: ICD-10-CM

## 2024-09-12 PROCEDURE — 96372 THER/PROPH/DIAG INJ SC/IM: CPT

## 2024-09-12 RX ORDER — TESTOSTERONE CYPIONATE 200 MG/ML
200 INJECTION, SOLUTION INTRAMUSCULAR
Refills: 0 | Status: COMPLETED | COMMUNITY
Start: 2024-09-12 | End: 2024-09-12

## 2024-09-12 RX ORDER — TESTOSTERONE CYPIONATE 200 MG/ML
200 INJECTION, SOLUTION INTRAMUSCULAR
Refills: 0 | Status: COMPLETED | OUTPATIENT
Start: 2024-09-12

## 2024-09-12 RX ADMIN — TESTOSTERONE CYPIONATE 0 MG/ML: 200 INJECTION INTRAMUSCULAR at 00:00

## 2024-09-24 ENCOUNTER — APPOINTMENT (OUTPATIENT)
Dept: UROLOGY | Facility: CLINIC | Age: 59
End: 2024-09-24
Payer: COMMERCIAL

## 2024-09-24 VITALS
WEIGHT: 300 LBS | BODY MASS INDEX: 42.47 KG/M2 | DIASTOLIC BLOOD PRESSURE: 74 MMHG | HEIGHT: 70.5 IN | RESPIRATION RATE: 16 BRPM | SYSTOLIC BLOOD PRESSURE: 119 MMHG | HEART RATE: 76 BPM | OXYGEN SATURATION: 96 %

## 2024-09-24 PROCEDURE — 96372 THER/PROPH/DIAG INJ SC/IM: CPT

## 2024-09-24 RX ORDER — NEEDLES, DISPOSABLE 25GX5/8"
18G X 1-1/2" NEEDLE, DISPOSABLE MISCELLANEOUS
Qty: 12 | Refills: 3 | Status: ACTIVE | COMMUNITY
Start: 2024-09-24 | End: 1900-01-01

## 2024-09-24 RX ORDER — TESTOSTERONE CYPIONATE 200 MG/ML
200 INJECTION, SOLUTION INTRAMUSCULAR
Refills: 0 | Status: COMPLETED | COMMUNITY
Start: 2024-09-24 | End: 2024-09-24

## 2024-09-24 RX ORDER — TESTOSTERONE CYPIONATE 200 MG/ML
200 INJECTION, SOLUTION INTRAMUSCULAR
Refills: 0 | Status: COMPLETED | OUTPATIENT
Start: 2024-09-24

## 2024-09-24 RX ORDER — TESTOSTERONE CYPIONATE 200 MG/ML
200 INJECTION, SOLUTION INTRAMUSCULAR
Qty: 6 | Refills: 0 | Status: ACTIVE | COMMUNITY
Start: 2024-09-24 | End: 1900-01-01

## 2024-09-24 RX ADMIN — TESTOSTERONE CYPIONATE 0 MG/ML: 200 INJECTION INTRAMUSCULAR at 00:00

## 2024-10-07 ENCOUNTER — TRANSCRIPTION ENCOUNTER (OUTPATIENT)
Age: 59
End: 2024-10-07

## 2024-12-09 PROBLEM — R97.20 ELEVATED PSA: Status: ACTIVE | Noted: 2024-12-02

## 2024-12-09 LAB
PSA FREE FLD-MCNC: 15 %
PSA FREE SERPL-MCNC: 0.91 NG/ML
PSA SERPL-MCNC: 6.03 NG/ML

## 2024-12-19 ENCOUNTER — APPOINTMENT (OUTPATIENT)
Dept: MRI IMAGING | Facility: CLINIC | Age: 59
End: 2024-12-19
Payer: COMMERCIAL

## 2024-12-19 ENCOUNTER — RESULT REVIEW (OUTPATIENT)
Age: 59
End: 2024-12-19

## 2024-12-19 ENCOUNTER — OUTPATIENT (OUTPATIENT)
Dept: OUTPATIENT SERVICES | Facility: HOSPITAL | Age: 59
LOS: 1 days | End: 2024-12-19
Payer: COMMERCIAL

## 2024-12-19 DIAGNOSIS — R97.20 ELEVATED PROSTATE SPECIFIC ANTIGEN [PSA]: ICD-10-CM

## 2024-12-19 PROCEDURE — 72197 MRI PELVIS W/O & W/DYE: CPT

## 2024-12-19 PROCEDURE — 76498 UNLISTED MR PROCEDURE: CPT

## 2024-12-19 PROCEDURE — 76498P: CUSTOM | Mod: 26

## 2024-12-19 PROCEDURE — A9585: CPT

## 2024-12-19 PROCEDURE — 72197 MRI PELVIS W/O & W/DYE: CPT | Mod: 26

## 2024-12-20 ENCOUNTER — NON-APPOINTMENT (OUTPATIENT)
Age: 59
End: 2024-12-20

## 2024-12-20 DIAGNOSIS — R97.20 ELEVATED PROSTATE, SPECIFIC ANTIGEN [PSA]: ICD-10-CM

## 2024-12-26 ENCOUNTER — OUTPATIENT (OUTPATIENT)
Dept: OUTPATIENT SERVICES | Facility: HOSPITAL | Age: 59
LOS: 1 days | End: 2024-12-26

## 2024-12-26 DIAGNOSIS — Z00.8 ENCOUNTER FOR OTHER GENERAL EXAMINATION: ICD-10-CM

## 2024-12-27 ENCOUNTER — OUTPATIENT (OUTPATIENT)
Dept: OUTPATIENT SERVICES | Facility: HOSPITAL | Age: 59
LOS: 1 days | End: 2024-12-27
Payer: COMMERCIAL

## 2024-12-27 DIAGNOSIS — R97.20 ELEVATED PROSTATE SPECIFIC ANTIGEN [PSA]: ICD-10-CM

## 2024-12-27 PROCEDURE — C8001: CPT

## 2025-01-14 ENCOUNTER — NON-APPOINTMENT (OUTPATIENT)
Age: 60
End: 2025-01-14

## 2025-01-15 ENCOUNTER — APPOINTMENT (OUTPATIENT)
Dept: UROLOGY | Facility: CLINIC | Age: 60
End: 2025-01-15
Payer: COMMERCIAL

## 2025-01-15 VITALS
RESPIRATION RATE: 16 BRPM | HEART RATE: 67 BPM | DIASTOLIC BLOOD PRESSURE: 79 MMHG | SYSTOLIC BLOOD PRESSURE: 124 MMHG | OXYGEN SATURATION: 99 %

## 2025-01-15 VITALS
WEIGHT: 285 LBS | BODY MASS INDEX: 42.21 KG/M2 | RESPIRATION RATE: 16 BRPM | OXYGEN SATURATION: 97 % | SYSTOLIC BLOOD PRESSURE: 127 MMHG | HEART RATE: 66 BPM | HEIGHT: 69 IN | DIASTOLIC BLOOD PRESSURE: 82 MMHG

## 2025-01-15 PROCEDURE — 55700: CPT

## 2025-01-15 PROCEDURE — 76999F: CUSTOM

## 2025-01-16 ENCOUNTER — NON-APPOINTMENT (OUTPATIENT)
Age: 60
End: 2025-01-16

## 2025-01-21 LAB — CORE LAB BIOPSY: NORMAL

## 2025-02-03 ENCOUNTER — APPOINTMENT (OUTPATIENT)
Dept: UROLOGY | Facility: CLINIC | Age: 60
End: 2025-02-03
Payer: COMMERCIAL

## 2025-02-03 PROCEDURE — 99214 OFFICE O/P EST MOD 30 MIN: CPT

## 2025-02-13 ENCOUNTER — APPOINTMENT (OUTPATIENT)
Dept: RADIATION ONCOLOGY | Facility: CLINIC | Age: 60
End: 2025-02-13
Payer: COMMERCIAL

## 2025-02-13 VITALS
OXYGEN SATURATION: 98 % | DIASTOLIC BLOOD PRESSURE: 66 MMHG | BODY MASS INDEX: 42.36 KG/M2 | HEIGHT: 69 IN | WEIGHT: 286 LBS | RESPIRATION RATE: 16 BRPM | HEART RATE: 70 BPM | SYSTOLIC BLOOD PRESSURE: 111 MMHG

## 2025-02-13 DIAGNOSIS — Z80.7 FAMILY HISTORY OF OTHER MALIGNANT NEOPLASMS OF LYMPHOID, HEMATOPOIETIC AND RELATED TISSUES: ICD-10-CM

## 2025-02-13 DIAGNOSIS — C61 MALIGNANT NEOPLASM OF PROSTATE: ICD-10-CM

## 2025-02-13 DIAGNOSIS — E78.00 PURE HYPERCHOLESTEROLEMIA, UNSPECIFIED: ICD-10-CM

## 2025-02-13 DIAGNOSIS — Z80.42 FAMILY HISTORY OF MALIGNANT NEOPLASM OF PROSTATE: ICD-10-CM

## 2025-02-13 DIAGNOSIS — Z78.9 OTHER SPECIFIED HEALTH STATUS: ICD-10-CM

## 2025-02-13 DIAGNOSIS — Z80.3 FAMILY HISTORY OF MALIGNANT NEOPLASM OF BREAST: ICD-10-CM

## 2025-02-13 PROCEDURE — 99204 OFFICE O/P NEW MOD 45 MIN: CPT

## 2025-02-13 RX ORDER — MULTIVITAMIN
TABLET ORAL
Refills: 0 | Status: ACTIVE | COMMUNITY

## 2025-02-19 ENCOUNTER — OUTPATIENT (OUTPATIENT)
Dept: OUTPATIENT SERVICES | Facility: HOSPITAL | Age: 60
LOS: 1 days | End: 2025-02-19
Payer: COMMERCIAL

## 2025-02-19 ENCOUNTER — APPOINTMENT (OUTPATIENT)
Dept: NUCLEAR MEDICINE | Facility: IMAGING CENTER | Age: 60
End: 2025-02-19
Payer: COMMERCIAL

## 2025-02-19 DIAGNOSIS — C61 MALIGNANT NEOPLASM OF PROSTATE: ICD-10-CM

## 2025-02-19 DIAGNOSIS — Z00.8 ENCOUNTER FOR OTHER GENERAL EXAMINATION: ICD-10-CM

## 2025-02-19 PROCEDURE — 78816 PET IMAGE W/CT FULL BODY: CPT | Mod: 26

## 2025-02-19 PROCEDURE — 78816 PET IMAGE W/CT FULL BODY: CPT

## 2025-02-24 ENCOUNTER — APPOINTMENT (OUTPATIENT)
Dept: UROLOGY | Facility: CLINIC | Age: 60
End: 2025-02-24

## 2025-02-25 ENCOUNTER — TRANSCRIPTION ENCOUNTER (OUTPATIENT)
Age: 60
End: 2025-02-25

## 2025-03-03 ENCOUNTER — RESULT REVIEW (OUTPATIENT)
Age: 60
End: 2025-03-03

## 2025-03-03 ENCOUNTER — APPOINTMENT (OUTPATIENT)
Dept: NUCLEAR MEDICINE | Facility: IMAGING CENTER | Age: 60
End: 2025-03-03
Payer: SELF-PAY

## 2025-03-03 ENCOUNTER — OUTPATIENT (OUTPATIENT)
Dept: OUTPATIENT SERVICES | Facility: HOSPITAL | Age: 60
LOS: 1 days | End: 2025-03-03
Payer: SELF-PAY

## 2025-03-03 ENCOUNTER — APPOINTMENT (OUTPATIENT)
Dept: NUCLEAR MEDICINE | Facility: IMAGING CENTER | Age: 60
End: 2025-03-03

## 2025-03-03 DIAGNOSIS — Z00.8 ENCOUNTER FOR OTHER GENERAL EXAMINATION: ICD-10-CM

## 2025-03-03 PROCEDURE — 78816 PET IMAGE W/CT FULL BODY: CPT

## 2025-03-03 PROCEDURE — 78816 PET IMAGE W/CT FULL BODY: CPT | Mod: 26,NC

## 2025-03-12 ENCOUNTER — APPOINTMENT (OUTPATIENT)
Dept: HEMATOLOGY ONCOLOGY | Facility: CLINIC | Age: 60
End: 2025-03-12

## 2025-03-14 ENCOUNTER — NON-APPOINTMENT (OUTPATIENT)
Age: 60
End: 2025-03-14

## 2025-03-18 ENCOUNTER — TRANSCRIPTION ENCOUNTER (OUTPATIENT)
Age: 60
End: 2025-03-18

## 2025-03-31 ENCOUNTER — NON-APPOINTMENT (OUTPATIENT)
Age: 60
End: 2025-03-31

## 2025-04-01 RX ORDER — RELUGOLIX 120 MG/1
120 TABLET, FILM COATED ORAL
Qty: 30 | Refills: 5 | Status: ACTIVE | COMMUNITY
Start: 2025-04-01 | End: 1900-01-01

## 2025-04-01 RX ORDER — RELUGOLIX 120 MG/1
120 TABLET, FILM COATED ORAL
Qty: 33 | Refills: 0 | Status: ACTIVE | COMMUNITY
Start: 2025-04-01 | End: 1900-01-01

## 2025-04-03 RX ORDER — AMOXICILLIN AND CLAVULANATE POTASSIUM 875; 125 MG/1; MG/1
875-125 TABLET, COATED ORAL
Qty: 6 | Refills: 0 | Status: ACTIVE | COMMUNITY
Start: 2025-04-03 | End: 1900-01-01

## 2025-04-14 ENCOUNTER — INPATIENT (INPATIENT)
Facility: HOSPITAL | Age: 60
LOS: 2 days | Discharge: HOME | DRG: 446 | End: 2025-04-17
Attending: SURGERY | Admitting: SURGERY
Payer: COMMERCIAL

## 2025-04-14 VITALS — HEIGHT: 69 IN | WEIGHT: 284.4 LBS

## 2025-04-14 DIAGNOSIS — K80.20 CALCULUS OF GALLBLADDER WITHOUT CHOLECYSTITIS WITHOUT OBSTRUCTION: ICD-10-CM

## 2025-04-14 LAB
ALBUMIN SERPL ELPH-MCNC: 3.6 G/DL — SIGNIFICANT CHANGE UP (ref 3.3–5)
ALP SERPL-CCNC: 70 U/L — SIGNIFICANT CHANGE UP (ref 40–120)
ALT FLD-CCNC: 28 U/L — SIGNIFICANT CHANGE UP (ref 12–78)
ANION GAP SERPL CALC-SCNC: 5 MMOL/L — SIGNIFICANT CHANGE UP (ref 5–17)
APPEARANCE UR: CLEAR — SIGNIFICANT CHANGE UP
APTT BLD: 30.1 SEC — SIGNIFICANT CHANGE UP (ref 24.5–35.6)
AST SERPL-CCNC: 18 U/L — SIGNIFICANT CHANGE UP (ref 15–37)
BASOPHILS # BLD AUTO: 0.04 K/UL — SIGNIFICANT CHANGE UP (ref 0–0.2)
BASOPHILS NFR BLD AUTO: 0.6 % — SIGNIFICANT CHANGE UP (ref 0–2)
BILIRUB SERPL-MCNC: 0.5 MG/DL — SIGNIFICANT CHANGE UP (ref 0.2–1.2)
BILIRUB UR-MCNC: NEGATIVE — SIGNIFICANT CHANGE UP
BUN SERPL-MCNC: 16 MG/DL — SIGNIFICANT CHANGE UP (ref 7–23)
CALCIUM SERPL-MCNC: 9.6 MG/DL — SIGNIFICANT CHANGE UP (ref 8.5–10.1)
CHLORIDE SERPL-SCNC: 107 MMOL/L — SIGNIFICANT CHANGE UP (ref 96–108)
CO2 SERPL-SCNC: 27 MMOL/L — SIGNIFICANT CHANGE UP (ref 22–31)
COLOR SPEC: YELLOW — SIGNIFICANT CHANGE UP
CREAT SERPL-MCNC: 1.11 MG/DL — SIGNIFICANT CHANGE UP (ref 0.5–1.3)
D DIMER BLD IA.RAPID-MCNC: 261 NG/ML DDU — HIGH
DIFF PNL FLD: NEGATIVE — SIGNIFICANT CHANGE UP
EGFR: 76 ML/MIN/1.73M2 — SIGNIFICANT CHANGE UP
EGFR: 76 ML/MIN/1.73M2 — SIGNIFICANT CHANGE UP
EOSINOPHIL # BLD AUTO: 0.18 K/UL — SIGNIFICANT CHANGE UP (ref 0–0.5)
EOSINOPHIL NFR BLD AUTO: 2.6 % — SIGNIFICANT CHANGE UP (ref 0–6)
FLUAV AG NPH QL: SIGNIFICANT CHANGE UP
FLUBV AG NPH QL: SIGNIFICANT CHANGE UP
GLUCOSE SERPL-MCNC: 147 MG/DL — HIGH (ref 70–99)
GLUCOSE UR QL: NEGATIVE MG/DL — SIGNIFICANT CHANGE UP
HCT VFR BLD CALC: 46.3 % — SIGNIFICANT CHANGE UP (ref 39–50)
HGB BLD-MCNC: 14.8 G/DL — SIGNIFICANT CHANGE UP (ref 13–17)
IMM GRANULOCYTES # BLD AUTO: 0.03 K/UL — SIGNIFICANT CHANGE UP (ref 0–0.07)
IMM GRANULOCYTES NFR BLD AUTO: 0.4 % — SIGNIFICANT CHANGE UP (ref 0–0.9)
INR BLD: 0.99 RATIO — SIGNIFICANT CHANGE UP (ref 0.85–1.16)
KETONES UR-MCNC: NEGATIVE MG/DL — SIGNIFICANT CHANGE UP
LACTATE SERPL-SCNC: 1.4 MMOL/L — SIGNIFICANT CHANGE UP (ref 0.7–2)
LACTATE SERPL-SCNC: 2.2 MMOL/L — HIGH (ref 0.7–2)
LEUKOCYTE ESTERASE UR-ACNC: NEGATIVE — SIGNIFICANT CHANGE UP
LIDOCAIN IGE QN: 52 U/L — SIGNIFICANT CHANGE UP (ref 13–75)
LYMPHOCYTES # BLD AUTO: 1.07 K/UL — SIGNIFICANT CHANGE UP (ref 1–3.3)
LYMPHOCYTES NFR BLD AUTO: 15.4 % — SIGNIFICANT CHANGE UP (ref 13–44)
MAGNESIUM SERPL-MCNC: 2.1 MG/DL — SIGNIFICANT CHANGE UP (ref 1.6–2.6)
MCHC RBC-ENTMCNC: 25 PG — LOW (ref 27–34)
MCHC RBC-ENTMCNC: 32 G/DL — SIGNIFICANT CHANGE UP (ref 32–36)
MCV RBC AUTO: 78.1 FL — LOW (ref 80–100)
MONOCYTES # BLD AUTO: 0.5 K/UL — SIGNIFICANT CHANGE UP (ref 0–0.9)
MONOCYTES NFR BLD AUTO: 7.2 % — SIGNIFICANT CHANGE UP (ref 2–14)
NEUTROPHILS # BLD AUTO: 5.12 K/UL — SIGNIFICANT CHANGE UP (ref 1.8–7.4)
NEUTROPHILS NFR BLD AUTO: 73.8 % — SIGNIFICANT CHANGE UP (ref 43–77)
NITRITE UR-MCNC: NEGATIVE — SIGNIFICANT CHANGE UP
NRBC # BLD AUTO: 0 K/UL — SIGNIFICANT CHANGE UP (ref 0–0)
NRBC # FLD: 0 K/UL — SIGNIFICANT CHANGE UP (ref 0–0)
NRBC BLD AUTO-RTO: 0 /100 WBCS — SIGNIFICANT CHANGE UP (ref 0–0)
NT-PROBNP SERPL-SCNC: 51 PG/ML — SIGNIFICANT CHANGE UP (ref 0–125)
PH UR: 8 — SIGNIFICANT CHANGE UP (ref 5–8)
PLATELET # BLD AUTO: 311 K/UL — SIGNIFICANT CHANGE UP (ref 150–400)
PMV BLD: 9.9 FL — SIGNIFICANT CHANGE UP (ref 7–13)
POTASSIUM SERPL-MCNC: 3.8 MMOL/L — SIGNIFICANT CHANGE UP (ref 3.5–5.3)
POTASSIUM SERPL-SCNC: 3.8 MMOL/L — SIGNIFICANT CHANGE UP (ref 3.5–5.3)
PROT SERPL-MCNC: 8.2 GM/DL — SIGNIFICANT CHANGE UP (ref 6–8.3)
PROT UR-MCNC: NEGATIVE MG/DL — SIGNIFICANT CHANGE UP
PROTHROM AB SERPL-ACNC: 11.4 SEC — SIGNIFICANT CHANGE UP (ref 9.9–13.4)
RBC # BLD: 5.93 M/UL — HIGH (ref 4.2–5.8)
RBC # FLD: 18.7 % — HIGH (ref 10.3–14.5)
RSV RNA NPH QL NAA+NON-PROBE: SIGNIFICANT CHANGE UP
SARS-COV-2 RNA SPEC QL NAA+PROBE: SIGNIFICANT CHANGE UP
SODIUM SERPL-SCNC: 139 MMOL/L — SIGNIFICANT CHANGE UP (ref 135–145)
SOURCE RESPIRATORY: SIGNIFICANT CHANGE UP
SP GR SPEC: >1.03 — HIGH (ref 1–1.03)
TROPONIN I, HIGH SENSITIVITY RESULT: 4.78 NG/L — SIGNIFICANT CHANGE UP
UROBILINOGEN FLD QL: 0.2 MG/DL — SIGNIFICANT CHANGE UP (ref 0.2–1)
WBC # BLD: 6.94 K/UL — SIGNIFICANT CHANGE UP (ref 3.8–10.5)
WBC # FLD AUTO: 6.94 K/UL — SIGNIFICANT CHANGE UP (ref 3.8–10.5)

## 2025-04-14 PROCEDURE — 88304 TISSUE EXAM BY PATHOLOGIST: CPT

## 2025-04-14 PROCEDURE — 99285 EMERGENCY DEPT VISIT HI MDM: CPT

## 2025-04-14 PROCEDURE — S2900: CPT

## 2025-04-14 PROCEDURE — C1889: CPT

## 2025-04-14 PROCEDURE — 86850 RBC ANTIBODY SCREEN: CPT

## 2025-04-14 PROCEDURE — 84100 ASSAY OF PHOSPHORUS: CPT

## 2025-04-14 PROCEDURE — 76705 ECHO EXAM OF ABDOMEN: CPT | Mod: 26

## 2025-04-14 PROCEDURE — 93010 ELECTROCARDIOGRAM REPORT: CPT

## 2025-04-14 PROCEDURE — 36415 COLL VENOUS BLD VENIPUNCTURE: CPT

## 2025-04-14 PROCEDURE — 86901 BLOOD TYPING SEROLOGIC RH(D): CPT

## 2025-04-14 PROCEDURE — 85027 COMPLETE CBC AUTOMATED: CPT

## 2025-04-14 PROCEDURE — 99255 IP/OBS CONSLTJ NEW/EST HI 80: CPT

## 2025-04-14 PROCEDURE — 99053 MED SERV 10PM-8AM 24 HR FAC: CPT

## 2025-04-14 PROCEDURE — 71045 X-RAY EXAM CHEST 1 VIEW: CPT | Mod: 26

## 2025-04-14 PROCEDURE — 86900 BLOOD TYPING SEROLOGIC ABO: CPT

## 2025-04-14 PROCEDURE — 83735 ASSAY OF MAGNESIUM: CPT

## 2025-04-14 PROCEDURE — 85025 COMPLETE CBC W/AUTO DIFF WBC: CPT

## 2025-04-14 PROCEDURE — 80053 COMPREHEN METABOLIC PANEL: CPT

## 2025-04-14 PROCEDURE — 83605 ASSAY OF LACTIC ACID: CPT

## 2025-04-14 PROCEDURE — 74177 CT ABD & PELVIS W/CONTRAST: CPT | Mod: 26

## 2025-04-14 PROCEDURE — C9399: CPT

## 2025-04-14 RX ORDER — ROSUVASTATIN CALCIUM 5 MG/1
5 TABLET, FILM COATED ORAL AT BEDTIME
Refills: 0 | Status: DISCONTINUED | OUTPATIENT
Start: 2025-04-14 | End: 2025-04-17

## 2025-04-14 RX ORDER — PIPERACILLIN-TAZO-DEXTROSE,ISO 3.375G/5
3.38 IV SOLUTION, PIGGYBACK PREMIX FROZEN(ML) INTRAVENOUS ONCE
Refills: 0 | Status: COMPLETED | OUTPATIENT
Start: 2025-04-14 | End: 2025-04-14

## 2025-04-14 RX ORDER — TIRZEPATIDE 7.5 MG/.5ML
15 INJECTION, SOLUTION SUBCUTANEOUS
Refills: 0 | DISCHARGE

## 2025-04-14 RX ORDER — ACETAMINOPHEN 500 MG/5ML
650 LIQUID (ML) ORAL EVERY 6 HOURS
Refills: 0 | Status: DISCONTINUED | OUTPATIENT
Start: 2025-04-14 | End: 2025-04-17

## 2025-04-14 RX ORDER — RELUGOLIX 120 MG/1
1 TABLET, FILM COATED ORAL
Refills: 0 | DISCHARGE

## 2025-04-14 RX ORDER — ENOXAPARIN SODIUM 100 MG/ML
40 INJECTION SUBCUTANEOUS EVERY 24 HOURS
Refills: 0 | Status: DISCONTINUED | OUTPATIENT
Start: 2025-04-14 | End: 2025-04-14

## 2025-04-14 RX ORDER — ACETAMINOPHEN 500 MG/5ML
1000 LIQUID (ML) ORAL ONCE
Refills: 0 | Status: COMPLETED | OUTPATIENT
Start: 2025-04-14 | End: 2025-04-14

## 2025-04-14 RX ORDER — ENOXAPARIN SODIUM 100 MG/ML
40 INJECTION SUBCUTANEOUS EVERY 12 HOURS
Refills: 0 | Status: DISCONTINUED | OUTPATIENT
Start: 2025-04-14 | End: 2025-04-15

## 2025-04-14 RX ORDER — TADALAFIL 20 MG/1
5 TABLET, FILM COATED ORAL DAILY
Refills: 0 | Status: DISCONTINUED | OUTPATIENT
Start: 2025-04-14 | End: 2025-04-15

## 2025-04-14 RX ORDER — PIPERACILLIN-TAZO-DEXTROSE,ISO 3.375G/5
3.38 IV SOLUTION, PIGGYBACK PREMIX FROZEN(ML) INTRAVENOUS EVERY 8 HOURS
Refills: 0 | Status: DISCONTINUED | OUTPATIENT
Start: 2025-04-14 | End: 2025-04-16

## 2025-04-14 RX ORDER — SODIUM CHLORIDE 9 G/1000ML
1000 INJECTION, SOLUTION INTRAVENOUS
Refills: 0 | Status: DISCONTINUED | OUTPATIENT
Start: 2025-04-14 | End: 2025-04-14

## 2025-04-14 RX ORDER — KETOROLAC TROMETHAMINE 30 MG/ML
30 INJECTION, SOLUTION INTRAMUSCULAR; INTRAVENOUS EVERY 6 HOURS
Refills: 0 | Status: DISCONTINUED | OUTPATIENT
Start: 2025-04-14 | End: 2025-04-17

## 2025-04-14 RX ORDER — GLUCOSAMINE HCL/CHONDROITIN SU 500-400 MG
1200 CAPSULE ORAL
Refills: 0 | DISCHARGE

## 2025-04-14 RX ORDER — ONDANSETRON HCL/PF 4 MG/2 ML
4 VIAL (ML) INJECTION ONCE
Refills: 0 | Status: COMPLETED | OUTPATIENT
Start: 2025-04-14 | End: 2025-04-14

## 2025-04-14 RX ORDER — ERGOCALCIFEROL 1.25 MG/1
1 CAPSULE ORAL
Refills: 0 | DISCHARGE

## 2025-04-14 RX ADMIN — Medication 4 MILLIGRAM(S): at 10:27

## 2025-04-14 RX ADMIN — Medication 4 MILLIGRAM(S): at 14:24

## 2025-04-14 RX ADMIN — Medication 4 MILLIGRAM(S): at 20:20

## 2025-04-14 RX ADMIN — ENOXAPARIN SODIUM 40 MILLIGRAM(S): 100 INJECTION SUBCUTANEOUS at 21:05

## 2025-04-14 RX ADMIN — Medication 1000 MILLILITER(S): at 09:25

## 2025-04-14 RX ADMIN — Medication 25 GRAM(S): at 22:16

## 2025-04-14 RX ADMIN — Medication 200 GRAM(S): at 09:24

## 2025-04-14 RX ADMIN — Medication 100 MILLILITER(S): at 14:27

## 2025-04-14 RX ADMIN — ROSUVASTATIN CALCIUM 5 MILLIGRAM(S): 5 TABLET, FILM COATED ORAL at 21:04

## 2025-04-14 RX ADMIN — Medication 100 MILLILITER(S): at 21:07

## 2025-04-14 RX ADMIN — Medication 400 MILLIGRAM(S): at 08:05

## 2025-04-14 RX ADMIN — Medication 25 GRAM(S): at 14:27

## 2025-04-14 RX ADMIN — Medication 1000 MILLILITER(S): at 08:05

## 2025-04-14 RX ADMIN — Medication 4 MILLIGRAM(S): at 14:39

## 2025-04-14 NOTE — PATIENT PROFILE ADULT - NSPROPASSIVESMOKEEXPOSURE_GEN_A_NUR
[Fever] : no fever [Chills] : no chills [Redness] : no redness [Fatigue] : no fatigue [Chest Pain] : no chest pain [Hearing Loss] : no hearing loss [Palpitations] : no palpitations [Lower Ext Edema] : no lower extremity edema [Shortness Of Breath] : no shortness of breath [Dyspnea on Exertion] : no dyspnea on exertion [Nausea] : no nausea [Diarrhea] : diarrhea Unknown [Constipation] : no constipation [Vomiting] : no vomiting [Skin Rash] : no skin rash [Itching] : no itching [Dizziness] : no dizziness [Fainting] : no fainting [Headache] : no headache [Unsteady Walking] : no ataxia [Confusion] : no confusion [Suicidal] : not suicidal

## 2025-04-14 NOTE — CONSULT NOTE ADULT - SUBJECTIVE AND OBJECTIVE BOX
PCP:  Jhoan Sullivan  Surgery:  Daryn    CHIEF COMPLAINT:   right sided abdominal pain    HISTORY OF THE PRESENT ILLNESS:  59 M presents to the ED this morning with acute sudden right upper quadrant abdominal pain that started early this morning.  Pain was initially mild and then he had a light breakfast and started driving himself to work.  As he was got closer to the hospital, the pain was getting much more steady and intense, it was not associated with nausea, fever, vomiting, fevers or chills.  Never had pain like this in the past.  No recent travel or diarrhea.  Pain radiated to the epigastric area and around to the right side near the flank/back area, no radiation to the right shoulder.  In the ED, he became dizzy and lightheaded due to the pain.  Workup in the ED was positive for gallstones and acute cholecystitis and lactate was elevated to 2.2.  He was admitted to Dr. Stearns's service and is planned to have an acute cholecystectomy on this admission.  Hospital Medicine is now consulted to assist with pre-operative medical management.    Patient denies any prior Hx of HTN, DM, CKD, Arrhthymias CAD/MI, CHF, COPD/Asthma, PE/DVT, CVA/TIA, RADHA.  He reports having a normal EKG and stress test a few years ago which was negative.  He denies any exertion chest pain, chest pressure or shortness of breath.    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 1000 milliLiter(s) (100 mL/Hr) IV Continuous <Continuous>  pantoprazole    Tablet 40 milliGRAM(s) Oral before breakfast  piperacillin/tazobactam IVPB.- 3.375 Gram(s) IV Intermittent once  piperacillin/tazobactam IVPB.. 3.375 Gram(s) IV Intermittent every 8 hours  rosuvastatin 5 milliGRAM(s) Oral at bedtime  sodium chloride 0.9%. 1000 milliLiter(s) (100 mL/Hr) IV Continuous <Continuous>    MEDICATIONS  (PRN):  acetaminophen     Tablet .. 650 milliGRAM(s) Oral every 6 hours PRN Temp greater or equal to 38.5C (101.3F)  ketorolac   Injectable 30 milliGRAM(s) IV Push every 6 hours PRN Mild Pain (1 - 3)  morphine  - Injectable 2 milliGRAM(s) IV Push every 2 hours PRN Moderate Pain (4 - 6)  morphine  - Injectable 4 milliGRAM(s) IV Push every 4 hours PRN Moderate Pain (4 - 6)    T(F): 98 (04-14-25 @ 12:29), Max: 98 (04-14-25 @ 07:28)  HR: 63 (04-14-25 @ 12:29) (60 - 70)  BP: 142/67 (04-14-25 @ 12:29) (134/71 - 176/77)  RR: 18 (04-14-25 @ 12:29) (16 - 18)  SpO2: 100% (04-14-25 @ 12:29) (100% - 100%)    HEENT:  pupils equal and reactive, EOMI, no oropharyngeal lesions, erythema, exudates, oral thrush  NECK:   supple, no carotid bruits  CV:  +S1, +S2, regular, no murmurs  RESP:   lungs clear to auscultation bilaterally, no wheezing, rales, rhonchi, good air entry bilaterally  GI:  abdomen soft, + RUQ pain with deep palpation, non-distended, normal BS  EXT:  no LE edema  NEURO:  AAOX3, no focal neurological deficits, follows all commands, able to move extremities spontaneously  SKIN:  no ulcers, lesions or rashes    LABS:                        14.8   6.94  )-----------( 311      ( 14 Apr 2025 07:55 )             46.3     04-14    139  |  107  |  16  ----------------------------<  147[H]  3.8   |  27  |  1.11    Ca    9.6      14 Apr 2025 07:55  Mg     2.1     04-14    TPro  8.2  /  Alb  3.6  /  TBili  0.5  /  DBili  x   /  AST  18  /  ALT  28  /  AlkPhos  70  04-14    LIVER FUNCTIONS - ( 14 Apr 2025 07:55 )  Alb: 3.6 g/dL / Pro: 8.2 gm/dL / ALK PHOS: 70 U/L / ALT: 28 U/L / AST: 18 U/L / GGT: x           PT/INR - ( 14 Apr 2025 07:55 )   PT: 11.4 sec;   INR: 0.99 ratio      CXR:  per my read, no acute infiltrates or effusions    EKG:  NSR, normal axis, normal RW progression, no acute ischemic changes, QTc 422   PCP:  Jhoan Sullivan  Surgery:  Daryn    CHIEF COMPLAINT:   right sided abdominal pain    HISTORY OF THE PRESENT ILLNESS:  59 M presents to the ED this morning with acute sudden right upper quadrant abdominal pain that started early this morning.  Pain was initially mild and then he had a light breakfast and started driving himself to work.  As he was got closer to the hospital, the pain was getting much more steady and intense, it was not associated with nausea, vomiting.  He did reported +chills.  Never had pain like this in the past.  No recent travel or diarrhea.  Pain radiated to the epigastric area and around to the right side near the flank/back area, no radiation to the right shoulder.  In the ED, he became dizzy and lightheaded due to the pain.  Workup in the ED was positive for gallstones and acute cholecystitis and lactate was elevated to 2.2.  He was admitted to Dr. Stearns's service and is planned to have an acute cholecystectomy on this admission.  Hospital Medicine is now consulted to assist with pre-operative medical management.    Patient denies any prior Hx of HTN, DM, CKD, Arrhthymias CAD/MI, CHF, COPD/Asthma, PE/DVT, CVA/TIA, RADHA.  He reports having a normal EKG and stress test a few years ago which was negative.  He denies any exertion chest pain, chest pressure or shortness of breath.    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 1000 milliLiter(s) (100 mL/Hr) IV Continuous <Continuous>  pantoprazole    Tablet 40 milliGRAM(s) Oral before breakfast  piperacillin/tazobactam IVPB.- 3.375 Gram(s) IV Intermittent once  piperacillin/tazobactam IVPB.. 3.375 Gram(s) IV Intermittent every 8 hours  rosuvastatin 5 milliGRAM(s) Oral at bedtime  sodium chloride 0.9%. 1000 milliLiter(s) (100 mL/Hr) IV Continuous <Continuous>    MEDICATIONS  (PRN):  acetaminophen     Tablet .. 650 milliGRAM(s) Oral every 6 hours PRN Temp greater or equal to 38.5C (101.3F)  ketorolac   Injectable 30 milliGRAM(s) IV Push every 6 hours PRN Mild Pain (1 - 3)  morphine  - Injectable 2 milliGRAM(s) IV Push every 2 hours PRN Moderate Pain (4 - 6)  morphine  - Injectable 4 milliGRAM(s) IV Push every 4 hours PRN Moderate Pain (4 - 6)    T(F): 98 (04-14-25 @ 12:29), Max: 98 (04-14-25 @ 07:28)  HR: 63 (04-14-25 @ 12:29) (60 - 70)  BP: 142/67 (04-14-25 @ 12:29) (134/71 - 176/77)  RR: 18 (04-14-25 @ 12:29) (16 - 18)  SpO2: 100% (04-14-25 @ 12:29) (100% - 100%)    HEENT:  pupils equal and reactive, EOMI, no oropharyngeal lesions, erythema, exudates, oral thrush  NECK:   supple, no carotid bruits  CV:  +S1, +S2, regular, no murmurs  RESP:   lungs clear to auscultation bilaterally, no wheezing, rales, rhonchi, good air entry bilaterally  GI:  abdomen soft, + RUQ pain with deep palpation, non-distended, normal BS  EXT:  no LE edema  NEURO:  AAOX3, no focal neurological deficits, follows all commands, able to move extremities spontaneously  SKIN:  no ulcers, lesions or rashes    LABS:                        14.8   6.94  )-----------( 311      ( 14 Apr 2025 07:55 )             46.3     04-14    139  |  107  |  16  ----------------------------<  147[H]  3.8   |  27  |  1.11    Ca    9.6      14 Apr 2025 07:55  Mg     2.1     04-14    TPro  8.2  /  Alb  3.6  /  TBili  0.5  /  DBili  x   /  AST  18  /  ALT  28  /  AlkPhos  70  04-14    LIVER FUNCTIONS - ( 14 Apr 2025 07:55 )  Alb: 3.6 g/dL / Pro: 8.2 gm/dL / ALK PHOS: 70 U/L / ALT: 28 U/L / AST: 18 U/L / GGT: x           PT/INR - ( 14 Apr 2025 07:55 )   PT: 11.4 sec;   INR: 0.99 ratio      CXR:  per my read, no acute infiltrates or effusions    EKG:  NSR, normal axis, normal RW progression, no acute ischemic changes, QTc 422   PCP:  Jhoan Sullivan  Surgery:  Daryn    CHIEF COMPLAINT:   right sided abdominal pain    HISTORY OF THE PRESENT ILLNESS:  59 M presents to the ED this morning with acute sudden right upper quadrant abdominal pain that started early this morning.  Pain was initially mild and then he had a light breakfast and started driving himself to work.  As he was got closer to the hospital, the pain was getting much more steady and intense, it was not associated with nausea, vomiting.  He did reported +chills.  Never had pain like this in the past.  No recent travel or diarrhea.  Pain radiated to the epigastric area and around to the right side near the flank/back area, no radiation to the right shoulder.  In the ED, he became dizzy and lightheaded due to the pain.  Workup in the ED was positive for gallstones and acute cholecystitis and lactate was elevated to 2.2.  He was admitted to Dr. Stearns's service and is planned to have a laparoscopic cholecystectomy on this admission.  Hospital Medicine is now consulted to assist with pre-operative medical management.    Patient denies any prior Hx of HTN, DM, CKD, Arrhthymias CAD/MI, CHF, COPD/Asthma, PE/DVT, CVA/TIA, RADHA.  He reports having a normal EKG and stress test a few years ago which was negative.  He denies any exertion chest pain, chest pressure or shortness of breath.    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 1000 milliLiter(s) (100 mL/Hr) IV Continuous <Continuous>  pantoprazole    Tablet 40 milliGRAM(s) Oral before breakfast  piperacillin/tazobactam IVPB.- 3.375 Gram(s) IV Intermittent once  piperacillin/tazobactam IVPB.. 3.375 Gram(s) IV Intermittent every 8 hours  rosuvastatin 5 milliGRAM(s) Oral at bedtime  sodium chloride 0.9%. 1000 milliLiter(s) (100 mL/Hr) IV Continuous <Continuous>    MEDICATIONS  (PRN):  acetaminophen     Tablet .. 650 milliGRAM(s) Oral every 6 hours PRN Temp greater or equal to 38.5C (101.3F)  ketorolac   Injectable 30 milliGRAM(s) IV Push every 6 hours PRN Mild Pain (1 - 3)  morphine  - Injectable 2 milliGRAM(s) IV Push every 2 hours PRN Moderate Pain (4 - 6)  morphine  - Injectable 4 milliGRAM(s) IV Push every 4 hours PRN Moderate Pain (4 - 6)    T(F): 98 (04-14-25 @ 12:29), Max: 98 (04-14-25 @ 07:28)  HR: 63 (04-14-25 @ 12:29) (60 - 70)  BP: 142/67 (04-14-25 @ 12:29) (134/71 - 176/77)  RR: 18 (04-14-25 @ 12:29) (16 - 18)  SpO2: 100% (04-14-25 @ 12:29) (100% - 100%)    HEENT:  pupils equal and reactive, EOMI, no oropharyngeal lesions, erythema, exudates, oral thrush  NECK:   supple, no carotid bruits  CV:  +S1, +S2, regular, no murmurs  RESP:   lungs clear to auscultation bilaterally, no wheezing, rales, rhonchi, good air entry bilaterally  GI:  abdomen soft, + RUQ pain with deep palpation, non-distended, normal BS  EXT:  no LE edema  NEURO:  AAOX3, no focal neurological deficits, follows all commands, able to move extremities spontaneously  SKIN:  no ulcers, lesions or rashes    LABS:                        14.8   6.94  )-----------( 311      ( 14 Apr 2025 07:55 )             46.3     04-14    139  |  107  |  16  ----------------------------<  147[H]  3.8   |  27  |  1.11    Ca    9.6      14 Apr 2025 07:55  Mg     2.1     04-14    TPro  8.2  /  Alb  3.6  /  TBili  0.5  /  DBili  x   /  AST  18  /  ALT  28  /  AlkPhos  70  04-14    LIVER FUNCTIONS - ( 14 Apr 2025 07:55 )  Alb: 3.6 g/dL / Pro: 8.2 gm/dL / ALK PHOS: 70 U/L / ALT: 28 U/L / AST: 18 U/L / GGT: x           PT/INR - ( 14 Apr 2025 07:55 )   PT: 11.4 sec;   INR: 0.99 ratio      CXR:  per my read, no acute infiltrates or effusions    EKG:  NSR, normal axis, normal RW progression, no acute ischemic changes, QTc 422

## 2025-04-14 NOTE — CONSULT NOTE ADULT - ASSESSMENT
59 M with Hx of Hyperlipidemia, GERD, BPH, recent diagnosis of Prostate Cancer in Jan 2025, obesity with BMI 42 currently on Zepbound with 45 lb weight loss, now presents with acute sudden onset of RUQ abdominal pain due to acute biliary colic, cholelithiasis and acute cholecystitis.    Acute Cholecystitis / Cholelithiasis  -  CT:  Distended gallbladder with centimeter-sized noncalcified gallstone and trace pericholecystic edema suggests acute cholecystitis  -  US:  Cholelithiasis without evidence of acute cholecystitis and fatty liver  -  primary management per surgery  -  IVFs  -  clear liquids for now  -  pain control  -  IV anti-emetics PRN  -  IV Zosyn (day #1)  -  WBC normal, LFTs and bilirubin normal  -  mildly elevated lactate (2.2) -> improved with IVFs  -  check labs in am  -  will need cholecystectomy on this admission, planned for Wednesday of this week to allow for Zepbound to washout    Obesity with BMI 42  -  on Zepbound -> holding for now  -  lost 45 lbs over 9 months    Left Liver Cyst and and Right Renal Cyst  -  these are small, outpatient follow up and monitoring    Hyperlipidemia  -  continue statin as LFTs are normal    BPH  -  continue Tadalafil    Prostate Cancer  -  recent diagnosis in Jan 2025, awaiting further treatment  -  continue LLH/FSH anatagonist Orgovyx (wife to bring in from home)    Pre-Operative Medical Evaluation  -  EKG:  NSR, normal axis, good RW progression, no acute ischemic changes  -  CXR:  per my read, no acute infiltrates or effusions  -  RCRI:  0  -  patient with good functional status, > 4 METS  -  patient is currently without any decompensated CHF, arrthymias or active ischemia  -  patient requires laparoscopic cholecystectomy on this admission  -  based on patient's co-morbidities and clinical predictors, patient is considered low risk for the proposed laparoscopic cholecystectomy procedure  -  at this time, the patient is medically optimized for the surgical procedure, there are no medical contraindications to proceed with surgery and patient does not need further cardiac workup    DVT prophylaxis  -  venodynes and Lovenox q12 based on BMI >40    d/w patient and RN   59 M with Hx of Hyperlipidemia, GERD, BPH, recent diagnosis of Prostate Cancer in Jan 2025, obesity with BMI 42 currently on Zepbound with 45 lb weight loss, now presents with acute sudden onset of RUQ abdominal pain due to acute biliary colic, cholelithiasis and acute cholecystitis.    Acute Cholecystitis / Cholelithiasis  -  CT:  Distended gallbladder with centimeter-sized noncalcified gallstone and trace pericholecystic edema suggests acute cholecystitis  -  US:  Cholelithiasis without evidence of acute cholecystitis and fatty liver  -  primary management per surgery  -  IVFs  -  clear liquids for now  -  pain control  -  IV anti-emetics PRN  -  IV Zosyn (day #1)  -  WBC normal, LFTs and bilirubin normal  -  check labs in am  -  will need cholecystectomy on this admission, planned for Wednesday of this week to allow for Zepbound to washout    Elevated Lactate  -  lactate 2.2 on admission, now cleared with IVFs  -  resolved    Obesity with BMI 42  -  on Zepbound -> holding for now and allowing to wash out prior to surgery  -  lost 45 lbs over 9 months    Left Liver Cyst and and Right Renal Cyst  -  these are small, outpatient follow up and monitoring    Hyperlipidemia  -  continue statin as LFTs are normal    BPH  -  continue Tadalafil    GERD  -  continue PPI    Prostate Cancer  -  recent diagnosis in Jan 2025, awaiting further treatment  -  continue LLH/FSH anatagonist Orgovyx (wife to bring in from home)    Pre-Operative Medical Evaluation  -  EKG:  NSR, normal axis, good RW progression, no acute ischemic changes  -  CXR:  per my read, no acute infiltrates or effusions  -  RCRI:  0  -  patient with good functional status, > 4 METS  -  patient is currently without any decompensated CHF, arrthymias or active ischemia  -  patient requires laparoscopic cholecystectomy on this admission  -  based on patient's co-morbidities and clinical predictors, patient is considered low risk for the proposed laparoscopic cholecystectomy procedure  -  at this time, the patient is medically optimized for the surgical procedure, there are no medical contraindications to proceed with surgery and patient does not need further cardiac workup    DVT prophylaxis  -  venodynes and Lovenox q12 based on BMI >40    d/w patient and RN   59 M with Hx of Hyperlipidemia, GERD, BPH, recent diagnosis of Prostate Cancer in Jan 2025, obesity with BMI 42 currently on Zepbound with 45 lb weight loss, now presents with acute sudden onset of RUQ abdominal pain due to acute biliary colic, cholelithiasis and acute cholecystitis.    Acute Cholecystitis / Cholelithiasis  -  CT:  Distended gallbladder with centimeter-sized noncalcified gallstone and trace pericholecystic edema suggests acute cholecystitis  -  US:  Cholelithiasis without evidence of acute cholecystitis and fatty liver  -  primary management per surgery  -  IVFs  -  clear liquids for now  -  pain control  -  IV anti-emetics PRN  -  IV Zosyn (day #1)  -  WBC normal, LFTs and bilirubin normal  -  follow up blood cultures  -  check labs in am  -  will need cholecystectomy on this admission, planned for Wednesday of this week to allow for Zepbound to washout    Elevated Lactate  -  lactate 2.2 on admission, now cleared with IVFs  -  resolved    Obesity with BMI 42  -  on Zepbound -> holding for now and allowing to wash out prior to surgery  -  lost 45 lbs over 9 months    Left Liver Cyst and and Right Renal Cyst  -  these are small, outpatient follow up and monitoring    Hyperlipidemia  -  continue statin as LFTs are normal    BPH  -  continue Tadalafil    GERD  -  continue PPI    Prostate Cancer  -  recent diagnosis in Jan 2025, awaiting further treatment  -  continue LLH/FSH anatagonist Orgovyx (wife to bring in from home)    Pre-Operative Medical Evaluation  -  EKG:  NSR, normal axis, good RW progression, no acute ischemic changes  -  CXR:  per my read, no acute infiltrates or effusions  -  RCRI:  0  -  patient with good functional status, > 4 METS  -  patient is currently without any decompensated CHF, arrthymias or active ischemia  -  patient requires laparoscopic cholecystectomy on this admission  -  based on patient's co-morbidities and clinical predictors, patient is considered low risk for the proposed laparoscopic cholecystectomy procedure  -  at this time, the patient is medically optimized for the surgical procedure, there are no medical contraindications to proceed with surgery and patient does not need further cardiac workup    DVT prophylaxis  -  venodynes and Lovenox q12 based on BMI >40    d/w patient and RN

## 2025-04-14 NOTE — H&P ADULT - NSHPLABSRESULTS_GEN_ALL_CORE
LABS:                          14.8   6.94  )-----------( 311      ( 14 Apr 2025 07:55 )             46.3     04-14    139  |  107  |  16  ----------------------------<  147[H]  3.8   |  27  |  1.11    Ca    9.6      14 Apr 2025 07:55  Mg     2.1     04-14    TPro  8.2  /  Alb  3.6  /  TBili  0.5  /  DBili  x   /  AST  18  /  ALT  28  /  AlkPhos  70  04-14    LIVER FUNCTIONS - ( 14 Apr 2025 07:55 )  Alb: 3.6 g/dL / Pro: 8.2 gm/dL / ALK PHOS: 70 U/L / ALT: 28 U/L / AST: 18 U/L / GGT: x           PT/INR - ( 14 Apr 2025 07:55 )   PT: 11.4 sec;   INR: 0.99 ratio         PTT - ( 14 Apr 2025 07:55 )  PTT:30.1 sec  Urinalysis Basic - ( 14 Apr 2025 09:28 )    Color: Yellow / Appearance: Clear / SG: >1.030 / pH: x  Gluc: x / Ketone: Negative mg/dL  / Bili: Negative / Urobili: 0.2 mg/dL   Blood: x / Protein: Negative mg/dL / Nitrite: Negative   Leuk Esterase: Negative / RBC: x / WBC x   Sq Epi: x / Non Sq Epi: x / Bacteria: x    ACC: [id number] EXAM:  US ABDOMEN RT UPR QUADRANT   ORDERED BY: LOY KEYS     PROCEDURE DATE:  04/14/2025          INTERPRETATION:  CLINICAL INFORMATION: Pain    COMPARISON: CT April 14, 2025    TECHNIQUE: Sonography of the right upper quadrant.    FINDINGS:  Liver: Increased parenchymal echogenicity compatible with steatosis. 1 cm   left hepatic lobe cyst.  Bile ducts: Normal caliber. Common bile duct measures 5 mm.  Gallbladder: Layering gallstone within the gallbladder lumen. No wall   thickening, pericholecystic fluid or abnormal gallbladder distention.   Negative sonographic Marques's sign.  Pancreas: Visualized portions are within normal limits.  Right kidney: 12.5 cm. No hydronephrosis. 1.4 cm simple cyst in the lower   pole.  Ascites: None.  IVC: Visualized portions are within normal limits.    IMPRESSION:  Cholelithiasis without evidence of acute cholecystitis.  Fatty liver.  Benign liver and right renal cysts.

## 2025-04-14 NOTE — ED ADULT NURSE NOTE - OBJECTIVE STATEMENT
58 y/o M presenting to ED with c/o RUQ pain radiating towards epigastrium and mid-axillary towards last rib region. patient reports the pain began after waking up this morning, around 5 AM. states when he woke up, he felt OK, but then started developing a dull pain which waxes and wanes accompanied by dizziness which worsens with pain. also reports feeling feverish. patient denies chest pain, shortness of breath. denies urinary symptoms. pain does not radiate toward flanks. no kidney stone hx. newly diagnosed with prostate CA on FHLH inhibitor. currently on Zepbound x9 months w/ reported 50 lb weight loss. did not take any medications for pain PTA. EKG obtained upon arrival, patient placed onto cardiac monitor in ED room, 20G IVL inserted to R antecubital and blood work obtained and sent to lab. additional pertinent pmhx: orchiectomy Aug 2024 for testicular torsion.

## 2025-04-14 NOTE — ED ADULT NURSE REASSESSMENT NOTE - NS ED NURSE REASSESS COMMENT FT1
Received report from KEREN Shcuster. Pt resting in stretcher, resp. even and unlabored. Endorses abdominal discomfort. MD aware. Awaiting med orders for pain. VS as noted. Pt in no acute distress. Updated on plan of care and verbalizes understanding.

## 2025-04-14 NOTE — ED PROVIDER NOTE - CLINICAL SUMMARY MEDICAL DECISION MAKING FREE TEXT BOX
58 yo M ruq pain, suddenly this am.  plan for labs, ekg, trop, ddimer, us ruq, ct abd/pel. UA. IVFs. Obs and reeval. Diff dx includes but is not limited to gall bladder disease, PE, pneumonia, renal colic. 58 yo M ruq abd pain, suddenly this am.  plan for labs, ekg, trop, ddimer, us ruq, ct abd/pel. UA. IVFs. Obs and reeval.   Diff dx includes but is not limited to gall bladder disease, PE, pneumonia, renal colic.

## 2025-04-14 NOTE — H&P ADULT - NSHPPHYSICALEXAM_GEN_ALL_CORE
Vital Signs Last 24 Hrs  T(C): 36.7 (14 Apr 2025 07:28), Max: 36.7 (14 Apr 2025 07:28)  T(F): 98 (14 Apr 2025 07:28), Max: 98 (14 Apr 2025 07:28)  HR: 62 (14 Apr 2025 10:16) (62 - 70)  BP: 156/69 (14 Apr 2025 10:16) (156/69 - 176/77)  BP(mean): 106 (14 Apr 2025 07:28) (106 - 106)  RR: 16 (14 Apr 2025 10:16) (16 - 18)  SpO2: 100% (14 Apr 2025 10:16) (100% - 100%)    Parameters below as of 14 Apr 2025 10:16  Patient On (Oxygen Delivery Method): room air

## 2025-04-14 NOTE — H&P ADULT - HISTORY OF PRESENT ILLNESS
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 Assoc with lightheaded feeling. No n/v. +subj fever today. +chills this AM. No CP or SOB. no Rash. No lower abd pain or urine sxs. Does radiate to the back.  No hx of DVT/PE.  No cough. No hx of kidney stone. GI Dr Nation

## 2025-04-14 NOTE — ED PROVIDER NOTE - PROGRESS NOTE DETAILS
d-dimer slightly elevated (from prostate ca?), however given ct abd pel showing gallstone and acute choly giving reason for his pain, hold on ct angio chest. MD GALLITO d/w dr montes de oca/GI. recommend surg eval . Dr Montes De Oca and Pt requestign Dr Stearns/Shannan.  D/W dr stearns. will see patient. updated pt and family of results and plan. pt feeling better after IV tylenol for pain. MD GALLITO

## 2025-04-14 NOTE — ED PROVIDER NOTE - OBJECTIVE STATEMENT
58 yo M from home with PMHx orchiectomy Aug 2024 for testicular torsion, Prostate CA on FHLH inhibitor, HLD on crestor, Zepbound for 9 months lost 50 lbs, with c/ o onset this AM of right upper abd pain. Assoc with lightheaded feeling. No n/v. +subj fever today. No CP or SOB. no Rash. No lower abd pain or urine sxs. Does radiate to the back.  No hx of DVT/PE.  No cough. No hx of kidney stone. 58 yo M from home with PMHx orchiectomy Aug 2024 for testicular torsion, Prostate CA on FHLH inhibitor, HLD on crestor, Zepbound for 9 months lost 50 lbs, with c/ o onset this AM of right upper abd pain. Assoc with lightheaded feeling. No n/v. +subj fever today. +chills this AM. No CP or SOB. no Rash. No lower abd pain or urine sxs. Does radiate to the back.  No hx of DVT/PE.  No cough. No hx of kidney stone. 60 yo M from home with PMHx orchiectomy Aug 2024 for testicular torsion, Prostate CA recently on FH/LH inhibitor drug , HLD on crestor, Zepbound for 9 months lost 50 lbs, with c/ o onset this AM of right upper abd pain. Assoc with lightheaded feeling. No n/v. +subj fever today. +chills this AM. No CP or SOB. no Rash. No lower abd pain or urine sxs. Does radiate to the back.  No hx of DVT/PE.  No cough. No hx of kidney stone. 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 Assoc with lightheaded feeling. No n/v. +subj fever today. +chills this AM. No CP or SOB. no Rash. No lower abd pain or urine sxs. Does radiate to the back.  No hx of DVT/PE.  No cough. No hx of kidney stone. GI Dr Nation

## 2025-04-14 NOTE — ED ADULT TRIAGE NOTE - CHIEF COMPLAINT QUOTE
pt ambulatory to triage c/o abdominal pain. reports he woke up this morning with R sided abdominal pain that is now generalized. endorsing dizziness, chills. denies n/v/d, chest pain, shortness of breath, fevers, weakness. pt newly diagnosed with prostate CA. no meds taken prior to arrival. sent for ekg.

## 2025-04-14 NOTE — PATIENT PROFILE ADULT - PHONE #
34 yo woman with h/o CVA s/p PFO closure on 07/29/2019.  Follow up with Dr. Pereira in 1-2 weeks, continue DAPT.  Pre-DM on recent labs, educcated on lifestyle modification and diet control.  Advised follow up with her PMD as well. [phone]

## 2025-04-14 NOTE — H&P ADULT - ASSESSMENT
A/P:  58 y/o male with acute cholelithiasis    Admit to Surgery - Dr. Stearns  Pain Medication PRN  Anti-Nausea medication PRN  Pt going for Lap/Robot Cholecystectomy on Wednesday

## 2025-04-14 NOTE — PATIENT PROFILE ADULT - FUNCTIONAL ASSESSMENT - BASIC MOBILITY 2.
She reports that starting about a week ago she began to feel sick.  She had a runny nose, watery eyes, cough, diarrhea, and myalgias.  She felt bad for about 2 days and then felt better for 1 day and then started feeling bad again.  Her symptoms have been waxing and waning.  She did have a positive Covid contact at work.  She has not taken a home Covid test.  She has not used any over-the-counter medications.  Her most bothersome symptom currently is nasal congestion and rhinorrhea.  She denies fevers, chills, shortness of breath.    
4 = No assist / stand by assistance

## 2025-04-15 LAB
ALBUMIN SERPL ELPH-MCNC: 2.8 G/DL — LOW (ref 3.3–5)
ALP SERPL-CCNC: 64 U/L — SIGNIFICANT CHANGE UP (ref 40–120)
ALT FLD-CCNC: 48 U/L — SIGNIFICANT CHANGE UP (ref 12–78)
ANION GAP SERPL CALC-SCNC: 6 MMOL/L — SIGNIFICANT CHANGE UP (ref 5–17)
AST SERPL-CCNC: 27 U/L — SIGNIFICANT CHANGE UP (ref 15–37)
BASOPHILS # BLD AUTO: 0.03 K/UL — SIGNIFICANT CHANGE UP (ref 0–0.2)
BASOPHILS NFR BLD AUTO: 0.3 % — SIGNIFICANT CHANGE UP (ref 0–2)
BILIRUB SERPL-MCNC: 0.9 MG/DL — SIGNIFICANT CHANGE UP (ref 0.2–1.2)
BUN SERPL-MCNC: 7 MG/DL — SIGNIFICANT CHANGE UP (ref 7–23)
CALCIUM SERPL-MCNC: 8.1 MG/DL — LOW (ref 8.5–10.1)
CHLORIDE SERPL-SCNC: 109 MMOL/L — HIGH (ref 96–108)
CO2 SERPL-SCNC: 22 MMOL/L — SIGNIFICANT CHANGE UP (ref 22–31)
CREAT SERPL-MCNC: 0.88 MG/DL — SIGNIFICANT CHANGE UP (ref 0.5–1.3)
EGFR: 98 ML/MIN/1.73M2 — SIGNIFICANT CHANGE UP
EGFR: 98 ML/MIN/1.73M2 — SIGNIFICANT CHANGE UP
EOSINOPHIL # BLD AUTO: 0.07 K/UL — SIGNIFICANT CHANGE UP (ref 0–0.5)
EOSINOPHIL NFR BLD AUTO: 0.7 % — SIGNIFICANT CHANGE UP (ref 0–6)
GLUCOSE SERPL-MCNC: 110 MG/DL — HIGH (ref 70–99)
HCT VFR BLD CALC: 40.3 % — SIGNIFICANT CHANGE UP (ref 39–50)
HGB BLD-MCNC: 13 G/DL — SIGNIFICANT CHANGE UP (ref 13–17)
IMM GRANULOCYTES # BLD AUTO: 0.03 K/UL — SIGNIFICANT CHANGE UP (ref 0–0.07)
IMM GRANULOCYTES NFR BLD AUTO: 0.3 % — SIGNIFICANT CHANGE UP (ref 0–0.9)
LYMPHOCYTES # BLD AUTO: 0.84 K/UL — LOW (ref 1–3.3)
LYMPHOCYTES NFR BLD AUTO: 8.1 % — LOW (ref 13–44)
MCHC RBC-ENTMCNC: 25 PG — LOW (ref 27–34)
MCHC RBC-ENTMCNC: 32.3 G/DL — SIGNIFICANT CHANGE UP (ref 32–36)
MCV RBC AUTO: 77.5 FL — LOW (ref 80–100)
MONOCYTES # BLD AUTO: 1.17 K/UL — HIGH (ref 0–0.9)
MONOCYTES NFR BLD AUTO: 11.3 % — SIGNIFICANT CHANGE UP (ref 2–14)
NEUTROPHILS # BLD AUTO: 8.17 K/UL — HIGH (ref 1.8–7.4)
NEUTROPHILS NFR BLD AUTO: 79.3 % — HIGH (ref 43–77)
NRBC # BLD AUTO: 0 K/UL — SIGNIFICANT CHANGE UP (ref 0–0)
NRBC # FLD: 0 K/UL — SIGNIFICANT CHANGE UP (ref 0–0)
NRBC BLD AUTO-RTO: 0 /100 WBCS — SIGNIFICANT CHANGE UP (ref 0–0)
PLATELET # BLD AUTO: 265 K/UL — SIGNIFICANT CHANGE UP (ref 150–400)
PMV BLD: 9.6 FL — SIGNIFICANT CHANGE UP (ref 7–13)
POTASSIUM SERPL-MCNC: 3.8 MMOL/L — SIGNIFICANT CHANGE UP (ref 3.5–5.3)
POTASSIUM SERPL-SCNC: 3.8 MMOL/L — SIGNIFICANT CHANGE UP (ref 3.5–5.3)
PROT SERPL-MCNC: 6.7 GM/DL — SIGNIFICANT CHANGE UP (ref 6–8.3)
RBC # BLD: 5.2 M/UL — SIGNIFICANT CHANGE UP (ref 4.2–5.8)
RBC # FLD: 18.2 % — HIGH (ref 10.3–14.5)
SODIUM SERPL-SCNC: 137 MMOL/L — SIGNIFICANT CHANGE UP (ref 135–145)
WBC # BLD: 10.31 K/UL — SIGNIFICANT CHANGE UP (ref 3.8–10.5)
WBC # FLD AUTO: 10.31 K/UL — SIGNIFICANT CHANGE UP (ref 3.8–10.5)

## 2025-04-15 PROCEDURE — 99232 SBSQ HOSP IP/OBS MODERATE 35: CPT

## 2025-04-15 RX ORDER — ACETAMINOPHEN 500 MG/5ML
1000 LIQUID (ML) ORAL ONCE
Refills: 0 | Status: COMPLETED | OUTPATIENT
Start: 2025-04-15 | End: 2025-04-15

## 2025-04-15 RX ORDER — ACETAMINOPHEN 500 MG/5ML
1000 LIQUID (ML) ORAL ONCE
Refills: 0 | Status: DISCONTINUED | OUTPATIENT
Start: 2025-04-15 | End: 2025-04-17

## 2025-04-15 RX ORDER — OXYCODONE HYDROCHLORIDE AND ACETAMINOPHEN 10; 325 MG/1; MG/1
1 TABLET ORAL
Qty: 20 | Refills: 0
Start: 2025-04-15 | End: 2025-04-19

## 2025-04-15 RX ADMIN — Medication 25 GRAM(S): at 06:01

## 2025-04-15 RX ADMIN — Medication 1000 MILLIGRAM(S): at 12:27

## 2025-04-15 RX ADMIN — Medication 100 MILLILITER(S): at 06:51

## 2025-04-15 RX ADMIN — Medication 400 MILLIGRAM(S): at 05:35

## 2025-04-15 RX ADMIN — Medication 400 MILLIGRAM(S): at 12:12

## 2025-04-15 RX ADMIN — Medication 25 GRAM(S): at 22:17

## 2025-04-15 RX ADMIN — ROSUVASTATIN CALCIUM 5 MILLIGRAM(S): 5 TABLET, FILM COATED ORAL at 21:35

## 2025-04-15 RX ADMIN — Medication 25 GRAM(S): at 13:00

## 2025-04-15 RX ADMIN — Medication 40 MILLIGRAM(S): at 06:01

## 2025-04-15 RX ADMIN — ENOXAPARIN SODIUM 40 MILLIGRAM(S): 100 INJECTION SUBCUTANEOUS at 08:53

## 2025-04-15 NOTE — PROGRESS NOTE ADULT - NS ATTEND AMEND GEN_ALL_CORE FT
Patient resting in chair w/o complaints, abdominal pain much improved, afebrile, and hemodynamically stable. Patient is optimized & cleared for cholecystectomy tomorrow. All questions regarding surgery addressed.

## 2025-04-15 NOTE — PROGRESS NOTE ADULT - SUBJECTIVE AND OBJECTIVE BOX
HPI: 60 yo M from home with PMHx orchiectomy Aug 2024 for testicular torsion, Prostate CA on Orgovyx x 1 week , HLD on crestor, Zepbound for 9 months lost 50 lbs, with c/ o onset this AM of right upper abd pain. Assoc with lightheaded feeling. No n/v. +subj fever today. +chills this AM. No CP or SOB. no Rash. No lower abd pain or urine sxs. Does radiate to the back.  No hx of DVT/PE.  No cough. No hx of kidney stone. GI Dr Nation    Interval events: Doing well on clear liquid diet. Pain has subsided. Cleared by hospitalist for cholecystectomy tomorrow. Zepbound being held.     Vital Signs Last 24 Hrs  T(C): 37.4 (15 Apr 2025 08:00), Max: 37.7 (14 Apr 2025 23:58)  T(F): 99.3 (15 Apr 2025 08:00), Max: 99.9 (14 Apr 2025 23:58)  HR: 79 (15 Apr 2025 08:00) (60 - 85)  BP: 103/58 (15 Apr 2025 08:00) (103/58 - 156/69)  BP(mean): 88 (14 Apr 2025 20:04) (87 - 88)  RR: 18 (15 Apr 2025 08:00) (16 - 18)  SpO2: 95% (15 Apr 2025 08:00) (94% - 100%)    Parameters below as of 15 Apr 2025 08:00  Patient On (Oxygen Delivery Method): room air    CBC:            13.0   10.31 )-----------( 265      ( 04-15-25 @ 06:47 )             40.3     Chem:         ( 04-15-25 @ 06:47 )    137  |  109[H]  |  7   ----------------------------<  110[H]  3.8   |  22  |  0.88    Liver Functions: ( 04-15-25 @ 06:47 )  Alb: 2.8 g/dL / Pro: 6.7 gm/dL / ALK PHOS: 64 U/L / ALT: 48 U/L / AST: 27 U/L / GGT: x          MEDICATIONS  (STANDING):  Orgovyx 120 milliGRAM(s) 1 Tablet(s) Oral daily  pantoprazole    Tablet 40 milliGRAM(s) Oral before breakfast  piperacillin/tazobactam IVPB.. 3.375 Gram(s) IV Intermittent every 8 hours  rosuvastatin 5 milliGRAM(s) Oral at bedtime  sodium chloride 0.9%. 1000 milliLiter(s) (100 mL/Hr) IV Continuous <Continuous>    Physical exam:   Awake, sitting in chair, comfortable appearing   S1, S2  Abdomen soft, nondistended, nontender, neg mcnair (took pain meds x3 overnight)   Skin warm and dry

## 2025-04-15 NOTE — PROGRESS NOTE ADULT - ASSESSMENT
59 M with Hx of Hyperlipidemia, GERD, BPH, recent diagnosis of Prostate Cancer in Jan 2025, obesity with BMI 42 currently on Zepbound with 45 lb weight loss, now presents with acute sudden onset of RUQ abdominal pain due to acute biliary colic, cholelithiasis and acute cholecystitis.    Acute Cholecystitis / Cholelithiasis  -  CT:  Distended gallbladder with centimeter-sized noncalcified gallstone and trace pericholecystic edema suggests acute cholecystitis  -  US:  Cholelithiasis without evidence of acute cholecystitis and fatty liver  -  primary management per surgery  -  clinically stable at this time without increasing pain, fevers or leukocytosis  -  LFTs and bilirubin are normal  -  pain control  -  continue with IV Zosyn (day #2)  -  continue clear liquids  -  d/c IVFs  -  follow up blood cultures  -  plan is for laparoscopic cholecystectomy tomorrow with Dr. Stearns    Elevated Lactate  -  lactate 2.2 on admission, now cleared with IVFs  -  resolved    Obesity with BMI 42  -  on Zepbound -> holding for now and allowing to wash out prior to surgery  -  lost 45 lbs over 9 months    Left Liver Cyst and Right Renal Cyst  -  these are small, outpatient follow up and monitoring    Hyperlipidemia  -  continue statin as LFTs are normal    GERD  -  continue PPI    Prostate Cancer  -  recent diagnosis in Jan 2025, awaiting further treatment  -  continue LH/FSH antagonist Orgovyx (wife to bring in from home)    Pre-Operative Medical Evaluation  -  EKG:  NSR, normal axis, good RW progression, no acute ischemic changes  -  CXR:  per my read, no acute infiltrates or effusions  -  RCRI:  0  -  patient with good functional status, > 4 METS  -  patient is currently without any decompensated CHF, arrthymias or active ischemia  -  patient requires laparoscopic cholecystectomy on this admission  -  based on patient's co-morbidities and clinical predictors, patient is considered low risk for the proposed laparoscopic cholecystectomy procedure  -  at this time, the patient is medically optimized for the surgical procedure, there are no medical contraindications to proceed with surgery and patient does not need further cardiac workup    DVT prophylaxis  -  venodynes and Lovenox q12 based on BMI >40    d/w patient and RN   59 M with Hx of Hyperlipidemia, GERD, BPH, recent diagnosis of Prostate Cancer in Jan 2025, obesity with BMI 42 currently on Zepbound with 45 lb weight loss, now presents with acute sudden onset of RUQ abdominal pain due to acute biliary colic, cholelithiasis and acute cholecystitis.    Acute Cholecystitis / Cholelithiasis  -  CT:  Distended gallbladder with centimeter-sized noncalcified gallstone and trace pericholecystic edema suggests acute cholecystitis  -  US:  Cholelithiasis without evidence of acute cholecystitis and fatty liver  -  primary management per surgery  -  clinically stable at this time without increasing pain, fevers or leukocytosis  -  LFTs and bilirubin are normal  -  pain control  -  continue with IV Zosyn (day #2)  -  continue clear liquids  -  d/c IVFs  -  follow up blood cultures  -  plan is for laparoscopic cholecystectomy tomorrow with Dr. Stearns  -  NPO except meds after MN    Elevated Lactate  -  lactate 2.2 on admission, now cleared with IVFs  -  resolved    Obesity with BMI 42  -  on Zepbound -> holding for now and allowing to wash out prior to surgery  -  lost 45 lbs over 9 months    Left Liver Cyst and Right Renal Cyst  -  these are small, outpatient follow up and monitoring    Hyperlipidemia  -  continue statin as LFTs are normal    GERD  -  continue PPI    Prostate Cancer  -  recent diagnosis in Jan 2025, awaiting further treatment  -  continue LH/FSH antagonist Orgovyx (wife to bring in from home)    Pre-Operative Medical Evaluation  -  EKG:  NSR, normal axis, good RW progression, no acute ischemic changes  -  CXR:  per my read, no acute infiltrates or effusions  -  RCRI:  0  -  patient with good functional status, > 4 METS  -  patient is currently without any decompensated CHF, arrthymias or active ischemia  -  patient requires laparoscopic cholecystectomy on this admission  -  based on patient's co-morbidities and clinical predictors, patient is considered low risk for the proposed laparoscopic cholecystectomy procedure  -  at this time, the patient is medically optimized for the surgical procedure, there are no medical contraindications to proceed with surgery and patient does not need further cardiac workup    DVT prophylaxis  -  venodynes and Lovenox q12 based on BMI >40    d/w patient and RN

## 2025-04-15 NOTE — PROGRESS NOTE ADULT - SUBJECTIVE AND OBJECTIVE BOX
Chart and meds reviewed.  Patient seen and examined.    4/15 - no events overnight, had Morphine around midnight for pain, no increasing RUQ pain, tolerating clears without pain, nausea or vomiting, no fevers or chills, no pain this morning    All other systems reviewed and found to be negative with the exception of what has been described above.    MEDICATIONS  (STANDING):  Orgovyx 120 milliGRAM(s) 1 Tablet(s) Oral daily  pantoprazole    Tablet 40 milliGRAM(s) Oral before breakfast  piperacillin/tazobactam IVPB.. 3.375 Gram(s) IV Intermittent every 8 hours  rosuvastatin 5 milliGRAM(s) Oral at bedtime    MEDICATIONS  (PRN):  acetaminophen     Tablet .. 650 milliGRAM(s) Oral every 6 hours PRN Temp greater or equal to 38.5C (101.3F)  acetaminophen   IVPB .. 1000 milliGRAM(s) IV Intermittent once PRN Temp greater or equal to 38C (100.4F), Mild Pain (1 - 3)  ketorolac   Injectable 30 milliGRAM(s) IV Push every 6 hours PRN Mild Pain (1 - 3)  morphine  - Injectable 2 milliGRAM(s) IV Push every 2 hours PRN Moderate Pain (4 - 6)  morphine  - Injectable 4 milliGRAM(s) IV Push every 4 hours PRN Moderate Pain (4 - 6)    VITAL SIGNS:  T(F): 99.3 (04-15-25 @ 08:00), Max: 99.9 (04-14-25 @ 23:58)  HR: 79 (04-15-25 @ 08:00) (60 - 85)  BP: 103/58 (04-15-25 @ 08:00) (103/58 - 146/66)  RR: 18 (04-15-25 @ 08:00) (17 - 18)  SpO2: 95% (04-15-25 @ 08:00) (94% - 100%)    PHYSICAL EXAM:  HEENT:  pupils equal and reactive, EOMI, no oropharyngeal lesions, erythema, exudates, oral thrush  NECK:   supple, no carotid bruits  CV:  +S1, +S2, regular, no murmurs  RESP:   lungs clear to auscultation bilaterally, no wheezing, rales, rhonchi, good air entry bilaterally  GI:  abdomen soft, non-tender, non-distended, normal BS  EXT:  no LE edema  NEURO:  AAOX3, no focal neurological deficits, follows all commands, able to move extremities spontaneously  SKIN:  no ulcers, lesions or rashes    LABS:                        13.0   10.31 )-----------( 265      ( 15 Apr 2025 06:47 )             40.3     04-15    137  |  109[H]  |  7   ----------------------------<  110[H]  3.8   |  22  |  0.88    Ca    8.1[L]      15 Apr 2025 06:47  Mg     2.1     04-14    TPro  6.7  /  Alb  2.8[L]  /  TBili  0.9  /  DBili  x   /  AST  27  /  ALT  48  /  AlkPhos  64  04-15   Chart and meds reviewed.  Patient seen and examined.    4/15 - no events overnight, had Morphine around midnight for pain, no increasing RUQ pain, tolerating clears without pain, nausea or vomiting, no fevers or chills, no abdominal pain this morning, reports mild headache this morning    All other systems reviewed and found to be negative with the exception of what has been described above.    MEDICATIONS  (STANDING):  Orgovyx 120 milliGRAM(s) 1 Tablet(s) Oral daily  pantoprazole    Tablet 40 milliGRAM(s) Oral before breakfast  piperacillin/tazobactam IVPB.. 3.375 Gram(s) IV Intermittent every 8 hours  rosuvastatin 5 milliGRAM(s) Oral at bedtime    MEDICATIONS  (PRN):  acetaminophen     Tablet .. 650 milliGRAM(s) Oral every 6 hours PRN Temp greater or equal to 38.5C (101.3F)  acetaminophen   IVPB .. 1000 milliGRAM(s) IV Intermittent once PRN Temp greater or equal to 38C (100.4F), Mild Pain (1 - 3)  ketorolac   Injectable 30 milliGRAM(s) IV Push every 6 hours PRN Mild Pain (1 - 3)  morphine  - Injectable 2 milliGRAM(s) IV Push every 2 hours PRN Moderate Pain (4 - 6)  morphine  - Injectable 4 milliGRAM(s) IV Push every 4 hours PRN Moderate Pain (4 - 6)    VITAL SIGNS:  T(F): 99.3 (04-15-25 @ 08:00), Max: 99.9 (04-14-25 @ 23:58)  HR: 79 (04-15-25 @ 08:00) (60 - 85)  BP: 103/58 (04-15-25 @ 08:00) (103/58 - 146/66)  RR: 18 (04-15-25 @ 08:00) (17 - 18)  SpO2: 95% (04-15-25 @ 08:00) (94% - 100%)    PHYSICAL EXAM:  HEENT:  pupils equal and reactive, EOMI, no oropharyngeal lesions, erythema, exudates, oral thrush  NECK:   supple, no carotid bruits  CV:  +S1, +S2, regular, no murmurs  RESP:   lungs clear to auscultation bilaterally, no wheezing, rales, rhonchi, good air entry bilaterally  GI:  abdomen soft, non-tender, non-distended, normal BS  EXT:  no LE edema  NEURO:  AAOX3, no focal neurological deficits, follows all commands, able to move extremities spontaneously  SKIN:  no ulcers, lesions or rashes    LABS:                        13.0   10.31 )-----------( 265      ( 15 Apr 2025 06:47 )             40.3     04-15    137  |  109[H]  |  7   ----------------------------<  110[H]  3.8   |  22  |  0.88    Ca    8.1[L]      15 Apr 2025 06:47  Mg     2.1     04-14    TPro  6.7  /  Alb  2.8[L]  /  TBili  0.9  /  DBili  x   /  AST  27  /  ALT  48  /  AlkPhos  64  04-15

## 2025-04-15 NOTE — PROGRESS NOTE ADULT - ASSESSMENT
A/P: 58 yo M hx of prostate ca, presenting with symptomatic cholelithiasis, plan for RA cholecystectomy 4/16.     - NPO at midnight for OR tomorrow  - Appreciate hospitalist clearance   - CLD for now   - Cont Zosyn   - Hold lovenox this evening and tomorrow AM in preparation for surgery   - Type and screen ordered for AM   - Ambulate/ OOB   - Cont to hold zepbound     Seen and discussed with Dr. Stearns

## 2025-04-16 ENCOUNTER — RESULT REVIEW (OUTPATIENT)
Age: 60
End: 2025-04-16

## 2025-04-16 ENCOUNTER — TRANSCRIPTION ENCOUNTER (OUTPATIENT)
Age: 60
End: 2025-04-16

## 2025-04-16 LAB
ALBUMIN SERPL ELPH-MCNC: 2.9 G/DL — LOW (ref 3.3–5)
ALP SERPL-CCNC: 56 U/L — SIGNIFICANT CHANGE UP (ref 40–120)
ALT FLD-CCNC: 33 U/L — SIGNIFICANT CHANGE UP (ref 12–78)
ANION GAP SERPL CALC-SCNC: 3 MMOL/L — LOW (ref 5–17)
AST SERPL-CCNC: 7 U/L — LOW (ref 15–37)
BILIRUB SERPL-MCNC: 0.8 MG/DL — SIGNIFICANT CHANGE UP (ref 0.2–1.2)
BLD GP AB SCN SERPL QL: SIGNIFICANT CHANGE UP
BUN SERPL-MCNC: 7 MG/DL — SIGNIFICANT CHANGE UP (ref 7–23)
CALCIUM SERPL-MCNC: 8.8 MG/DL — SIGNIFICANT CHANGE UP (ref 8.5–10.1)
CHLORIDE SERPL-SCNC: 109 MMOL/L — HIGH (ref 96–108)
CO2 SERPL-SCNC: 27 MMOL/L — SIGNIFICANT CHANGE UP (ref 22–31)
CREAT SERPL-MCNC: 0.87 MG/DL — SIGNIFICANT CHANGE UP (ref 0.5–1.3)
EGFR: 98 ML/MIN/1.73M2 — SIGNIFICANT CHANGE UP
EGFR: 98 ML/MIN/1.73M2 — SIGNIFICANT CHANGE UP
GLUCOSE SERPL-MCNC: 94 MG/DL — SIGNIFICANT CHANGE UP (ref 70–99)
HCT VFR BLD CALC: 40.7 % — SIGNIFICANT CHANGE UP (ref 39–50)
HGB BLD-MCNC: 13 G/DL — SIGNIFICANT CHANGE UP (ref 13–17)
MCHC RBC-ENTMCNC: 24.9 PG — LOW (ref 27–34)
MCHC RBC-ENTMCNC: 31.9 G/DL — LOW (ref 32–36)
MCV RBC AUTO: 77.8 FL — LOW (ref 80–100)
NRBC # BLD AUTO: 0 K/UL — SIGNIFICANT CHANGE UP (ref 0–0)
NRBC # FLD: 0 K/UL — SIGNIFICANT CHANGE UP (ref 0–0)
NRBC BLD AUTO-RTO: 0 /100 WBCS — SIGNIFICANT CHANGE UP (ref 0–0)
PLATELET # BLD AUTO: 265 K/UL — SIGNIFICANT CHANGE UP (ref 150–400)
PMV BLD: 9.9 FL — SIGNIFICANT CHANGE UP (ref 7–13)
POTASSIUM SERPL-MCNC: 3.9 MMOL/L — SIGNIFICANT CHANGE UP (ref 3.5–5.3)
POTASSIUM SERPL-SCNC: 3.9 MMOL/L — SIGNIFICANT CHANGE UP (ref 3.5–5.3)
PROT SERPL-MCNC: 6.9 GM/DL — SIGNIFICANT CHANGE UP (ref 6–8.3)
RBC # BLD: 5.23 M/UL — SIGNIFICANT CHANGE UP (ref 4.2–5.8)
RBC # FLD: 18.5 % — HIGH (ref 10.3–14.5)
SODIUM SERPL-SCNC: 139 MMOL/L — SIGNIFICANT CHANGE UP (ref 135–145)
WBC # BLD: 8.36 K/UL — SIGNIFICANT CHANGE UP (ref 3.8–10.5)
WBC # FLD AUTO: 8.36 K/UL — SIGNIFICANT CHANGE UP (ref 3.8–10.5)

## 2025-04-16 PROCEDURE — 88304 TISSUE EXAM BY PATHOLOGIST: CPT | Mod: 26

## 2025-04-16 PROCEDURE — 99232 SBSQ HOSP IP/OBS MODERATE 35: CPT

## 2025-04-16 RX ORDER — SODIUM CHLORIDE 9 G/1000ML
1000 INJECTION, SOLUTION INTRAVENOUS
Refills: 0 | Status: DISCONTINUED | OUTPATIENT
Start: 2025-04-16 | End: 2025-04-16

## 2025-04-16 RX ORDER — OXYCODONE HYDROCHLORIDE 30 MG/1
10 TABLET ORAL ONCE
Refills: 0 | Status: DISCONTINUED | OUTPATIENT
Start: 2025-04-16 | End: 2025-04-16

## 2025-04-16 RX ORDER — OXYCODONE HYDROCHLORIDE 30 MG/1
5 TABLET ORAL ONCE
Refills: 0 | Status: DISCONTINUED | OUTPATIENT
Start: 2025-04-16 | End: 2025-04-16

## 2025-04-16 RX ORDER — ONDANSETRON HCL/PF 4 MG/2 ML
4 VIAL (ML) INJECTION ONCE
Refills: 0 | Status: DISCONTINUED | OUTPATIENT
Start: 2025-04-16 | End: 2025-04-17

## 2025-04-16 RX ORDER — HYDROMORPHONE/SOD CHLOR,ISO/PF 2 MG/10 ML
1 SYRINGE (ML) INJECTION
Refills: 0 | Status: DISCONTINUED | OUTPATIENT
Start: 2025-04-16 | End: 2025-04-16

## 2025-04-16 RX ORDER — OXYCODONE HYDROCHLORIDE 30 MG/1
10 TABLET ORAL DAILY
Refills: 0 | Status: DISCONTINUED | OUTPATIENT
Start: 2025-04-16 | End: 2025-04-17

## 2025-04-16 RX ORDER — BUPIVACAINE 13.3 MG/ML
20 INJECTION, SUSPENSION, LIPOSOMAL INFILTRATION ONCE
Refills: 0 | Status: DISCONTINUED | OUTPATIENT
Start: 2025-04-16 | End: 2025-04-17

## 2025-04-16 RX ORDER — ENOXAPARIN SODIUM 100 MG/ML
40 INJECTION SUBCUTANEOUS EVERY 12 HOURS
Refills: 0 | Status: DISCONTINUED | OUTPATIENT
Start: 2025-04-17 | End: 2025-04-17

## 2025-04-16 RX ORDER — HYDROMORPHONE/SOD CHLOR,ISO/PF 2 MG/10 ML
0.5 SYRINGE (ML) INJECTION
Refills: 0 | Status: DISCONTINUED | OUTPATIENT
Start: 2025-04-16 | End: 2025-04-16

## 2025-04-16 RX ORDER — KETOROLAC TROMETHAMINE 30 MG/ML
30 INJECTION, SOLUTION INTRAMUSCULAR; INTRAVENOUS ONCE
Refills: 0 | Status: DISCONTINUED | OUTPATIENT
Start: 2025-04-16 | End: 2025-04-16

## 2025-04-16 RX ORDER — ACETAMINOPHEN 500 MG/5ML
1000 LIQUID (ML) ORAL ONCE
Refills: 0 | Status: COMPLETED | OUTPATIENT
Start: 2025-04-16 | End: 2025-04-17

## 2025-04-16 RX ADMIN — KETOROLAC TROMETHAMINE 30 MILLIGRAM(S): 30 INJECTION, SOLUTION INTRAMUSCULAR; INTRAVENOUS at 11:06

## 2025-04-16 RX ADMIN — KETOROLAC TROMETHAMINE 30 MILLIGRAM(S): 30 INJECTION, SOLUTION INTRAMUSCULAR; INTRAVENOUS at 11:04

## 2025-04-16 RX ADMIN — OXYCODONE HYDROCHLORIDE 5 MILLIGRAM(S): 30 TABLET ORAL at 21:27

## 2025-04-16 RX ADMIN — OXYCODONE HYDROCHLORIDE 10 MILLIGRAM(S): 30 TABLET ORAL at 15:41

## 2025-04-16 RX ADMIN — OXYCODONE HYDROCHLORIDE 10 MILLIGRAM(S): 30 TABLET ORAL at 16:20

## 2025-04-16 RX ADMIN — Medication 1 MILLIGRAM(S): at 10:39

## 2025-04-16 RX ADMIN — ROSUVASTATIN CALCIUM 5 MILLIGRAM(S): 5 TABLET, FILM COATED ORAL at 21:19

## 2025-04-16 RX ADMIN — Medication 1 MILLIGRAM(S): at 10:54

## 2025-04-16 RX ADMIN — KETOROLAC TROMETHAMINE 30 MILLIGRAM(S): 30 INJECTION, SOLUTION INTRAMUSCULAR; INTRAVENOUS at 11:19

## 2025-04-16 RX ADMIN — Medication 25 GRAM(S): at 06:18

## 2025-04-16 NOTE — DISCHARGE NOTE PROVIDER - NSDCMRMEDTOKEN_GEN_ALL_CORE_FT
Chondroitin-Glucosamine: 1,200 milligram(s) orally 2 times a day  ergocalciferol 1.25 mg (50,000 intl units) oral capsule: 1 cap(s) orally once a week friday  Magnesium Glycinate: once a day  Omeprazole: 20 milligram(s) once a day  Orgovyx 120 mg oral tablet: 1 tab(s) orally once a day  Percocet 5 mg-325 mg oral tablet: 1 tab(s) orally every 6 hours as needed for  moderate pain MDD: 4gm  rosuvastatin 5 mg oral tablet: 1 tab(s) orally once a day  tadalafil 5 mg oral tablet: 1 tab(s) orally once a day  tirzepatide 15 mg/0.5 mL subcutaneous solution: 15 milligram(s) subcutaneously once a week on Fridays, last dose taken on Thurs

## 2025-04-16 NOTE — PROGRESS NOTE ADULT - ASSESSMENT
59 M with Hx of Hyperlipidemia, GERD, BPH, recent diagnosis of Prostate Cancer in Jan 2025, obesity with BMI 42 currently on Zepbound with 45 lb weight loss, now presents with acute sudden onset of RUQ abdominal pain due to acute biliary colic, cholelithiasis and acute cholecystitis.    Acute Cholecystitis / Cholelithiasis  -  CT:  Distended gallbladder with centimeter-sized noncalcified gallstone and trace pericholecystic edema suggests acute cholecystitis  -  US:  Cholelithiasis without evidence of acute cholecystitis and fatty liver  -  s/p laparoscopic cholecystectomy today, POD #0  -  pain control  -  incentive spirometry  -  OOB to chair / ambulate  -  wound care per surgery  -  d/c IV ABXs  -  diet per surgery  -  blood cultures negative    Elevated Lactate  -  lactate 2.2 on admission, now cleared with IVFs  -  resolved    Obesity with BMI 42  -  on Zepbound -> holding for now and allowing to wash out prior to surgery  -  lost 45 lbs over 9 months    Left Liver Cyst and Right Renal Cyst  -  these are small, outpatient follow up and monitoring    Hyperlipidemia  -  continue statin as LFTs are normal    GERD  -  continue PPI    Prostate Cancer  -  recent diagnosis in Jan 2025, awaiting further treatment  -  continue LH/FSH antagonist Orgovyx (wife to bring in from home)    DVT prophylaxis  -  venodynes and Lovenox q12 based on BMI >40    d/w patient and RN       59 M with Hx of Hyperlipidemia, GERD, BPH, recent diagnosis of Prostate Cancer in Jan 2025, obesity with BMI 42 currently on Zepbound with 45 lb weight loss, now presents with acute sudden onset of RUQ abdominal pain due to acute biliary colic, cholelithiasis and acute cholecystitis.    Acute Cholecystitis / Cholelithiasis  -  CT:  Distended gallbladder with centimeter-sized noncalcified gallstone and trace pericholecystic edema suggests acute cholecystitis  -  US:  Cholelithiasis without evidence of acute cholecystitis and fatty liver  -  s/p laparoscopic cholecystectomy today, POD #0  -  pain control  -  incentive spirometry  -  OOB to chair / ambulate  -  wound care per surgery  -  d/c IV ABXs  -  clear liquid diet, advance as toleratec  -  blood cultures negative    Elevated Lactate  -  lactate 2.2 on admission, now cleared with IVFs  -  resolved    Obesity with BMI 42  -  on Zepbound -> holding for now and allowing to wash out prior to surgery  -  lost 45 lbs over 9 months    Left Liver Cyst and Right Renal Cyst  -  these are small, outpatient follow up and monitoring    Hyperlipidemia  -  continue statin as LFTs are normal    GERD  -  continue PPI    Prostate Cancer  -  recent diagnosis in Jan 2025, awaiting further treatment  -  continue LH/FSH antagonist Orgovyx (wife to bring in from home)    DVT prophylaxis  -  venodynes and Lovenox    d/w patient and RN

## 2025-04-16 NOTE — DISCHARGE NOTE PROVIDER - CARE PROVIDER_API CALL
Gibran Stearns  Surgery  [address] [place] [address], [address]  [place] [address]  Phone: [phone]  Fax: [phone]  Follow Up Time:

## 2025-04-16 NOTE — DISCHARGE NOTE PROVIDER - NSDCFUSCHEDAPPT_GEN_ALL_CORE_FT
[facility]  MRI  [facility]  Scheduled Appointment: 04/28/2025    Clinton Zabala  [facility]  DERM [address]  Scheduled Appointment: 05/23/2025

## 2025-04-16 NOTE — BRIEF OPERATIVE NOTE - NSICDXBRIEFPOSTOP_GEN_ALL_CORE_FT
POST-OP DIAGNOSIS:  Cholelithiasis with acute on chronic cholecystitis 16-Apr-2025 10:12:44  Shireen Gu  Cholelithiasis with acute on chronic cholecystitis 16-Apr-2025 10:16:04  Shireen Gu

## 2025-04-16 NOTE — PROGRESS NOTE ADULT - SUBJECTIVE AND OBJECTIVE BOX
Chart and meds reviewed.  Patient seen and examined.    4/16 - s/p laparoscopic cholecystectomy this morning with Dr. Stearns    All other systems reviewed and found to be negative with the exception of what has been described above.    MEDICATIONS  (STANDING):  BUpivacaine liposome 1.3% Injectable 20 milliLiter(s) Local Injection once  lactated ringers. 1000 milliLiter(s) (75 mL/Hr) IV Continuous <Continuous>  Orgovyx 120 milliGRAM(s) 1 Tablet(s) Oral daily  pantoprazole    Tablet 40 milliGRAM(s) Oral before breakfast  pantoprazole    Tablet 40 milliGRAM(s) Oral before breakfast  piperacillin/tazobactam IVPB.. 3.375 Gram(s) IV Intermittent every 8 hours  rosuvastatin 5 milliGRAM(s) Oral at bedtime    MEDICATIONS  (PRN):  acetaminophen     Tablet .. 650 milliGRAM(s) Oral every 6 hours PRN Temp greater or equal to 38.5C (101.3F)  acetaminophen   IVPB .. 1000 milliGRAM(s) IV Intermittent once PRN Temp greater or equal to 38C (100.4F), Mild Pain (1 - 3)  acetaminophen   IVPB .. 1000 milliGRAM(s) IV Intermittent once PRN Mild Pain (1 - 3)  HYDROmorphone  Injectable 0.5 milliGRAM(s) IV Push every 10 minutes PRN Moderate Pain (4 - 6)  HYDROmorphone  Injectable 1 milliGRAM(s) IV Push every 10 minutes PRN Severe Pain (7 - 10)  ketorolac   Injectable 30 milliGRAM(s) IV Push every 6 hours PRN Mild Pain (1 - 3)  morphine  - Injectable 2 milliGRAM(s) IV Push every 2 hours PRN Breakthrough Pain  morphine  - Injectable 2 milliGRAM(s) IV Push every 4 hours PRN Severe Pain (7 - 10)  ondansetron Injectable 4 milliGRAM(s) IV Push once PRN Nausea and/or Vomiting  oxyCODONE    IR 5 milliGRAM(s) Oral once PRN Mild Pain (1 - 3)  oxyCODONE    IR 10 milliGRAM(s) Oral daily PRN Moderate Pain (4 - 6)  oxyCODONE    IR 5 milliGRAM(s) Oral once PRN Moderate Pain (4 - 6)  oxyCODONE    IR 10 milliGRAM(s) Oral once PRN Severe Pain (7 - 10)    VITAL SIGNS:  T(F): 98.2 (04-16-25 @ 11:58), Max: 99.7 (04-16-25 @ 00:00)  HR: 73 (04-16-25 @ 11:58) (69 - 87)  BP: 126/64 (04-16-25 @ 11:58) (113/52 - 143/73)  RR: 18 (04-16-25 @ 11:58) (13 - 18)  SpO2: 91% (04-16-25 @ 11:58) (91% - 99%)    I&O's Summary    16 Apr 2025 07:01  -  16 Apr 2025 12:08  --------------------------------------------------------  IN: 1650 mL / OUT: 0 mL / NET: 1650 mL    PHYSICAL EXAM:  HEENT:  pupils equal and reactive, EOMI, no oropharyngeal lesions, erythema, exudates, oral thrush  NECK:   supple, no carotid bruits  CV:  +S1, +S2, regular, no murmurs  RESP:   lungs clear to auscultation bilaterally, no wheezing, rales, rhonchi, good air entry bilaterally  GI:  abdomen soft, non-tender, non-distended, normal BS  EXT:  no clubbing, no cyanosis, no edema, no calf pain, swelling or erythema  NEURO:  AAOX3, no focal neurological deficits, follows all commands, able to move extremities spontaneously  SKIN:  no ulcers, lesions or rashes    LABS:                        13.0   8.36  )-----------( 265      ( 16 Apr 2025 07:30 )             40.7     04-16    139  |  109[H]  |  7   ----------------------------<  94  3.9   |  27  |  0.87    Ca    8.8      16 Apr 2025 07:30    TPro  6.9  /  Alb  2.9[L]  /  TBili  0.8  /  DBili  x   /  AST  7[L]  /  ALT  33  /  AlkPhos  56  04-16    LIVER FUNCTIONS - ( 16 Apr 2025 07:30 )  Alb: 2.9 g/dL / Pro: 6.9 gm/dL / ALK PHOS: 56 U/L / ALT: 33 U/L / AST: 7 U/L / GGT: x         Chart and meds reviewed.  Patient seen and examined.    4/16 - s/p laparoscopic cholecystectomy this morning with Dr. Stearns, feels woozy now, mild pain, no nausea    All other systems reviewed and found to be negative with the exception of what has been described above.    MEDICATIONS  (STANDING):  BUpivacaine liposome 1.3% Injectable 20 milliLiter(s) Local Injection once  lactated ringers. 1000 milliLiter(s) (75 mL/Hr) IV Continuous <Continuous>  Orgovyx 120 milliGRAM(s) 1 Tablet(s) Oral daily  pantoprazole    Tablet 40 milliGRAM(s) Oral before breakfast  pantoprazole    Tablet 40 milliGRAM(s) Oral before breakfast  piperacillin/tazobactam IVPB.. 3.375 Gram(s) IV Intermittent every 8 hours  rosuvastatin 5 milliGRAM(s) Oral at bedtime    MEDICATIONS  (PRN):  acetaminophen     Tablet .. 650 milliGRAM(s) Oral every 6 hours PRN Temp greater or equal to 38.5C (101.3F)  acetaminophen   IVPB .. 1000 milliGRAM(s) IV Intermittent once PRN Temp greater or equal to 38C (100.4F), Mild Pain (1 - 3)  acetaminophen   IVPB .. 1000 milliGRAM(s) IV Intermittent once PRN Mild Pain (1 - 3)  HYDROmorphone  Injectable 0.5 milliGRAM(s) IV Push every 10 minutes PRN Moderate Pain (4 - 6)  HYDROmorphone  Injectable 1 milliGRAM(s) IV Push every 10 minutes PRN Severe Pain (7 - 10)  ketorolac   Injectable 30 milliGRAM(s) IV Push every 6 hours PRN Mild Pain (1 - 3)  morphine  - Injectable 2 milliGRAM(s) IV Push every 2 hours PRN Breakthrough Pain  morphine  - Injectable 2 milliGRAM(s) IV Push every 4 hours PRN Severe Pain (7 - 10)  ondansetron Injectable 4 milliGRAM(s) IV Push once PRN Nausea and/or Vomiting  oxyCODONE    IR 5 milliGRAM(s) Oral once PRN Mild Pain (1 - 3)  oxyCODONE    IR 10 milliGRAM(s) Oral daily PRN Moderate Pain (4 - 6)  oxyCODONE    IR 5 milliGRAM(s) Oral once PRN Moderate Pain (4 - 6)  oxyCODONE    IR 10 milliGRAM(s) Oral once PRN Severe Pain (7 - 10)    VITAL SIGNS:  T(F): 98.2 (04-16-25 @ 11:58), Max: 99.7 (04-16-25 @ 00:00)  HR: 73 (04-16-25 @ 11:58) (69 - 87)  BP: 126/64 (04-16-25 @ 11:58) (113/52 - 143/73)  RR: 18 (04-16-25 @ 11:58) (13 - 18)  SpO2: 91% (04-16-25 @ 11:58) (91% - 99%)    I&O's Summary    16 Apr 2025 07:01  -  16 Apr 2025 12:08  --------------------------------------------------------  IN: 1650 mL / OUT: 0 mL / NET: 1650 mL    PHYSICAL EXAM:  HEENT:  pupils equal and reactive, EOMI, no oropharyngeal lesions, erythema, exudates, oral thrush  NECK:   supple, no carotid bruits  CV:  +S1, +S2, regular, no murmurs  RESP:   lungs clear to auscultation bilaterally, no wheezing, rales, rhonchi, good air entry bilaterally  GI:  abdomen soft, mild right UQ pain, +BS, non-distended  EXT:  no LE edema  NEURO:  AAOX3, no focal neurological deficits, follows all commands, able to move extremities spontaneously  SKIN:  no ulcers, lesions or rashes    LABS:                        13.0   8.36  )-----------( 265      ( 16 Apr 2025 07:30 )             40.7     04-16    139  |  109[H]  |  7   ----------------------------<  94  3.9   |  27  |  0.87    Ca    8.8      16 Apr 2025 07:30    TPro  6.9  /  Alb  2.9[L]  /  TBili  0.8  /  DBili  x   /  AST  7[L]  /  ALT  33  /  AlkPhos  56  04-16    LIVER FUNCTIONS - ( 16 Apr 2025 07:30 )  Alb: 2.9 g/dL / Pro: 6.9 gm/dL / ALK PHOS: 56 U/L / ALT: 33 U/L / AST: 7 U/L / GGT: x

## 2025-04-16 NOTE — BRIEF OPERATIVE NOTE - NSICDXBRIEFPREOP_GEN_ALL_CORE_FT
PRE-OP DIAGNOSIS:  Cholelithiasis with acute on chronic cholecystitis 16-Apr-2025 10:16:47  Shireen Gu H

## 2025-04-16 NOTE — DISCHARGE NOTE PROVIDER - HOSPITAL COURSE
Patient is a 60 yo M that underwent laparoscopic robotic assisted cholecystectomy without any complications. Patient is currently doing very well, pain controlled, and is tolerating phase I bariatric diet. Patient has had uncomplicated hospital course and is stable for discharge home with follow-up in the office in 2 weeks.

## 2025-04-17 ENCOUNTER — TRANSCRIPTION ENCOUNTER (OUTPATIENT)
Age: 60
End: 2025-04-17

## 2025-04-17 VITALS
TEMPERATURE: 98 F | SYSTOLIC BLOOD PRESSURE: 105 MMHG | RESPIRATION RATE: 16 BRPM | DIASTOLIC BLOOD PRESSURE: 58 MMHG | HEART RATE: 58 BPM | OXYGEN SATURATION: 95 %

## 2025-04-17 LAB
ALBUMIN SERPL ELPH-MCNC: 2.6 G/DL — LOW (ref 3.3–5)
ALP SERPL-CCNC: 64 U/L — SIGNIFICANT CHANGE UP (ref 40–120)
ALT FLD-CCNC: 53 U/L — SIGNIFICANT CHANGE UP (ref 12–78)
ANION GAP SERPL CALC-SCNC: 4 MMOL/L — LOW (ref 5–17)
AST SERPL-CCNC: 24 U/L — SIGNIFICANT CHANGE UP (ref 15–37)
BILIRUB SERPL-MCNC: 0.5 MG/DL — SIGNIFICANT CHANGE UP (ref 0.2–1.2)
BUN SERPL-MCNC: 8 MG/DL — SIGNIFICANT CHANGE UP (ref 7–23)
CALCIUM SERPL-MCNC: 8.9 MG/DL — SIGNIFICANT CHANGE UP (ref 8.5–10.1)
CHLORIDE SERPL-SCNC: 109 MMOL/L — HIGH (ref 96–108)
CO2 SERPL-SCNC: 25 MMOL/L — SIGNIFICANT CHANGE UP (ref 22–31)
CREAT SERPL-MCNC: 0.79 MG/DL — SIGNIFICANT CHANGE UP (ref 0.5–1.3)
EGFR: 101 ML/MIN/1.73M2 — SIGNIFICANT CHANGE UP
EGFR: 101 ML/MIN/1.73M2 — SIGNIFICANT CHANGE UP
GLUCOSE SERPL-MCNC: 118 MG/DL — HIGH (ref 70–99)
HCT VFR BLD CALC: 39.9 % — SIGNIFICANT CHANGE UP (ref 39–50)
HGB BLD-MCNC: 12.9 G/DL — LOW (ref 13–17)
MAGNESIUM SERPL-MCNC: 2 MG/DL — SIGNIFICANT CHANGE UP (ref 1.6–2.6)
MCHC RBC-ENTMCNC: 25.1 PG — LOW (ref 27–34)
MCHC RBC-ENTMCNC: 32.3 G/DL — SIGNIFICANT CHANGE UP (ref 32–36)
MCV RBC AUTO: 77.8 FL — LOW (ref 80–100)
NRBC # BLD AUTO: 0 K/UL — SIGNIFICANT CHANGE UP (ref 0–0)
NRBC # FLD: 0 K/UL — SIGNIFICANT CHANGE UP (ref 0–0)
NRBC BLD AUTO-RTO: 0 /100 WBCS — SIGNIFICANT CHANGE UP (ref 0–0)
PHOSPHATE SERPL-MCNC: 3 MG/DL — SIGNIFICANT CHANGE UP (ref 2.5–4.5)
PLATELET # BLD AUTO: 248 K/UL — SIGNIFICANT CHANGE UP (ref 150–400)
PMV BLD: 9.9 FL — SIGNIFICANT CHANGE UP (ref 7–13)
POTASSIUM SERPL-MCNC: 4.3 MMOL/L — SIGNIFICANT CHANGE UP (ref 3.5–5.3)
POTASSIUM SERPL-SCNC: 4.3 MMOL/L — SIGNIFICANT CHANGE UP (ref 3.5–5.3)
PROT SERPL-MCNC: 6.7 GM/DL — SIGNIFICANT CHANGE UP (ref 6–8.3)
RBC # BLD: 5.13 M/UL — SIGNIFICANT CHANGE UP (ref 4.2–5.8)
RBC # FLD: 17.1 % — HIGH (ref 10.3–14.5)
SODIUM SERPL-SCNC: 138 MMOL/L — SIGNIFICANT CHANGE UP (ref 135–145)
WBC # BLD: 13.7 K/UL — HIGH (ref 3.8–10.5)
WBC # FLD AUTO: 13.7 K/UL — HIGH (ref 3.8–10.5)

## 2025-04-17 RX ORDER — ACETAMINOPHEN 500 MG/5ML
1000 LIQUID (ML) ORAL ONCE
Refills: 0 | Status: COMPLETED | OUTPATIENT
Start: 2025-04-17 | End: 2025-04-17

## 2025-04-17 RX ADMIN — Medication 400 MILLIGRAM(S): at 09:37

## 2025-04-17 RX ADMIN — Medication 1000 MILLIGRAM(S): at 09:52

## 2025-04-17 RX ADMIN — Medication 40 MILLIGRAM(S): at 06:09

## 2025-04-17 RX ADMIN — Medication 400 MILLIGRAM(S): at 00:25

## 2025-04-17 RX ADMIN — ENOXAPARIN SODIUM 40 MILLIGRAM(S): 100 INJECTION SUBCUTANEOUS at 09:38

## 2025-04-17 NOTE — DISCHARGE NOTE NURSING/CASE MANAGEMENT/SOCIAL WORK - PATIENT PORTAL LINK FT
You can access the FollowMyHealth Patient Portal offered by [facility] by registering at the following website: [email or web address]. By joining [facility]’s FollowMyHealth portal, you will also be able to view your health information using other applications (apps) compatible with our system.

## 2025-04-17 NOTE — DISCHARGE NOTE NURSING/CASE MANAGEMENT/SOCIAL WORK - FINANCIAL ASSISTANCE
[facility] provides services at a reduced cost to those who are determined to be eligible through [facility]’s financial assistance program. Information regarding [facility]’s financial assistance program can be found by going to [email or web address] or by calling [phone].

## 2025-04-17 NOTE — CHART NOTE - NSCHARTNOTEFT_GEN_A_CORE
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 Assoc with lightheaded feeling. +subj fever today. +chills this AM. Does radiate to the back. CT:  Distended gallbladder with centimeter-sized noncalcified gallstone and trace pericholecystic edema suggests acute cholecystitis. US:  Cholelithiasis without evidence of acute cholecystitis and fatty liver. S/p laparoscopic cholecystectomy 4/16. Admit dx: Acute Cholecystitis / Cholelithiasis.    Upon RD visit, pt POD 1. Currently tolerating regular diet. Pt reports consuming ~100% of eggs w/ english muffin and fruit this AM. Reports current wt of 272#. States he started Zepbound 9 mos ago when he weighed 326#. Intentional wt loss of 54#/17% x 9 mos. S/p cholecystectomy, RD provided verbal and written nutrition education on low fat diet upon d'c - pt receptive, all questions answered. Plan for d/c today 4/17. RD will remain available for any further questions if warranted.     Shelly Raza, MS, RDN, CDN

## 2025-04-19 LAB
CULTURE RESULTS: SIGNIFICANT CHANGE UP
CULTURE RESULTS: SIGNIFICANT CHANGE UP
SPECIMEN SOURCE: SIGNIFICANT CHANGE UP
SPECIMEN SOURCE: SIGNIFICANT CHANGE UP

## 2025-04-21 ENCOUNTER — NON-APPOINTMENT (OUTPATIENT)
Age: 60
End: 2025-04-21

## 2025-04-21 ENCOUNTER — TRANSCRIPTION ENCOUNTER (OUTPATIENT)
Age: 60
End: 2025-04-21

## 2025-04-22 LAB — SURGICAL PATHOLOGY STUDY: SIGNIFICANT CHANGE UP

## 2025-04-23 DIAGNOSIS — K80.12 CALCULUS OF GALLBLADDER WITH ACUTE AND CHRONIC CHOLECYSTITIS WITHOUT OBSTRUCTION: ICD-10-CM

## 2025-04-23 DIAGNOSIS — E66.9 OBESITY, UNSPECIFIED: ICD-10-CM

## 2025-04-23 DIAGNOSIS — C61 MALIGNANT NEOPLASM OF PROSTATE: ICD-10-CM

## 2025-04-23 DIAGNOSIS — K21.9 GASTRO-ESOPHAGEAL REFLUX DISEASE WITHOUT ESOPHAGITIS: ICD-10-CM

## 2025-04-23 DIAGNOSIS — E87.20 ACIDOSIS, UNSPECIFIED: ICD-10-CM

## 2025-04-28 ENCOUNTER — NON-APPOINTMENT (OUTPATIENT)
Age: 60
End: 2025-04-28

## 2025-05-01 ENCOUNTER — NON-APPOINTMENT (OUTPATIENT)
Age: 60
End: 2025-05-01

## 2025-05-05 ENCOUNTER — TRANSCRIPTION ENCOUNTER (OUTPATIENT)
Age: 60
End: 2025-05-05

## 2025-05-06 ENCOUNTER — OUTPATIENT (OUTPATIENT)
Dept: OUTPATIENT SERVICES | Facility: HOSPITAL | Age: 60
LOS: 1 days | End: 2025-05-06
Payer: COMMERCIAL

## 2025-05-06 ENCOUNTER — APPOINTMENT (OUTPATIENT)
Dept: MRI IMAGING | Facility: CLINIC | Age: 60
End: 2025-05-06
Payer: COMMERCIAL

## 2025-05-06 DIAGNOSIS — Z00.8 ENCOUNTER FOR OTHER GENERAL EXAMINATION: ICD-10-CM

## 2025-05-06 DIAGNOSIS — C61 MALIGNANT NEOPLASM OF PROSTATE: ICD-10-CM

## 2025-05-06 PROCEDURE — A9585: CPT

## 2025-05-06 PROCEDURE — 72197 MRI PELVIS W/O & W/DYE: CPT | Mod: 26

## 2025-05-06 PROCEDURE — 76498 UNLISTED MR PROCEDURE: CPT

## 2025-05-06 PROCEDURE — 72197 MRI PELVIS W/O & W/DYE: CPT

## 2025-05-12 ENCOUNTER — LABORATORY RESULT (OUTPATIENT)
Age: 60
End: 2025-05-12

## 2025-05-12 DIAGNOSIS — R30.0 DYSURIA: ICD-10-CM

## 2025-05-16 LAB
ALBUMIN SERPL ELPH-MCNC: 4.1 G/DL
ALP BLD-CCNC: 77 U/L
ALT SERPL-CCNC: 23 U/L
ANION GAP SERPL CALC-SCNC: 14 MMOL/L
APPEARANCE: ABNORMAL
AST SERPL-CCNC: 18 U/L
BILIRUB SERPL-MCNC: 0.5 MG/DL
BILIRUBIN URINE: ABNORMAL
BLOOD URINE: NEGATIVE
BUN SERPL-MCNC: 20 MG/DL
CALCIUM SERPL-MCNC: 9.2 MG/DL
CHLORIDE SERPL-SCNC: 106 MMOL/L
CHOLEST SERPL-MCNC: 159 MG/DL
CO2 SERPL-SCNC: 24 MMOL/L
COLOR: NORMAL
CREAT SERPL-MCNC: 0.91 MG/DL
EGFRCR SERPLBLD CKD-EPI 2021: 96 ML/MIN/1.73M2
GLUCOSE QUALITATIVE U: NEGATIVE MG/DL
GLUCOSE SERPL-MCNC: 98 MG/DL
HCT VFR BLD CALC: 42.1 %
HDLC SERPL-MCNC: 49 MG/DL
HGB BLD-MCNC: 13.2 G/DL
KETONES URINE: ABNORMAL MG/DL
LDLC SERPL-MCNC: 96 MG/DL
LEUKOCYTE ESTERASE URINE: ABNORMAL
MCHC RBC-ENTMCNC: 25.3 PG
MCHC RBC-ENTMCNC: 31.4 G/DL
MCV RBC AUTO: 80.7 FL
NITRITE URINE: NEGATIVE
NONHDLC SERPL-MCNC: 110 MG/DL
PH URINE: 5.5
PLATELET # BLD AUTO: 383 K/UL
POTASSIUM SERPL-SCNC: 4.8 MMOL/L
PROT SERPL-MCNC: 7.6 G/DL
PROTEIN URINE: 30 MG/DL
PSA SERPL-MCNC: 8.53 NG/ML
RBC # BLD: 5.22 M/UL
RBC # FLD: 16.7 %
SODIUM SERPL-SCNC: 144 MMOL/L
SPECIFIC GRAVITY URINE: >1.03
TESTOST FREE SERPL-MCNC: 1.6 PG/ML
TESTOST SERPL-MCNC: 7.8 NG/DL
TRIGL SERPL-MCNC: 71 MG/DL
UROBILINOGEN URINE: 1 MG/DL
WBC # FLD AUTO: 10.45 K/UL

## 2025-05-20 ENCOUNTER — NON-APPOINTMENT (OUTPATIENT)
Age: 60
End: 2025-05-20

## 2025-05-21 ENCOUNTER — NON-APPOINTMENT (OUTPATIENT)
Age: 60
End: 2025-05-21

## 2025-05-22 PROCEDURE — 77338 DESIGN MLC DEVICE FOR IMRT: CPT

## 2025-05-22 PROCEDURE — 77300 RADIATION THERAPY DOSE PLAN: CPT

## 2025-05-22 PROCEDURE — 77301 RADIOTHERAPY DOSE PLAN IMRT: CPT

## 2025-05-23 ENCOUNTER — APPOINTMENT (OUTPATIENT)
Dept: DERMATOLOGY | Facility: CLINIC | Age: 60
End: 2025-05-23
Payer: COMMERCIAL

## 2025-05-23 ENCOUNTER — NON-APPOINTMENT (OUTPATIENT)
Age: 60
End: 2025-05-23

## 2025-05-23 DIAGNOSIS — L81.4 OTHER MELANIN HYPERPIGMENTATION: ICD-10-CM

## 2025-05-23 DIAGNOSIS — L40.9 PSORIASIS, UNSPECIFIED: ICD-10-CM

## 2025-05-23 DIAGNOSIS — Z80.8 FAMILY HISTORY OF MALIGNANT NEOPLASM OF OTHER ORGANS OR SYSTEMS: ICD-10-CM

## 2025-05-23 DIAGNOSIS — D22.5 MELANOCYTIC NEVI OF TRUNK: ICD-10-CM

## 2025-05-23 DIAGNOSIS — D22.71 MELANOCYTIC NEVI OF RIGHT LOWER LIMB, INCLUDING HIP: ICD-10-CM

## 2025-05-23 PROCEDURE — 99204 OFFICE O/P NEW MOD 45 MIN: CPT

## 2025-05-23 RX ORDER — CLOBETASOL PROPIONATE 0.5 MG/G
AEROSOL, FOAM TOPICAL
Refills: 0 | Status: DISCONTINUED | COMMUNITY
End: 2025-05-23

## 2025-05-23 RX ORDER — CIPROFLOXACIN HYDROCHLORIDE 250 MG/1
250 TABLET, FILM COATED ORAL
Qty: 10 | Refills: 0 | Status: DISCONTINUED | COMMUNITY
Start: 2025-05-13 | End: 2025-05-23

## 2025-05-27 ENCOUNTER — TRANSCRIPTION ENCOUNTER (OUTPATIENT)
Age: 60
End: 2025-05-27

## 2025-05-28 ENCOUNTER — NON-APPOINTMENT (OUTPATIENT)
Age: 60
End: 2025-05-28

## 2025-05-28 DIAGNOSIS — C61 MALIGNANT NEOPLASM OF PROSTATE: ICD-10-CM

## 2025-05-28 PROCEDURE — 77373 STRTCTC BDY RAD THER TX DLVR: CPT

## 2025-05-29 RX ORDER — CLOBETASOL PROPIONATE 0.5 MG/G
0.05 AEROSOL, FOAM TOPICAL TWICE DAILY
Qty: 1 | Refills: 5 | Status: ACTIVE | COMMUNITY
Start: 2025-05-23 | End: 1900-01-01

## 2025-06-12 RX ORDER — TAMSULOSIN HYDROCHLORIDE 0.4 MG/1
0.4 CAPSULE ORAL
Qty: 180 | Refills: 0 | Status: ACTIVE | COMMUNITY
Start: 2025-06-12 | End: 1900-01-01

## 2025-07-01 ENCOUNTER — TRANSCRIPTION ENCOUNTER (OUTPATIENT)
Age: 60
End: 2025-07-01

## 2025-07-07 ENCOUNTER — APPOINTMENT (OUTPATIENT)
Dept: RADIATION ONCOLOGY | Facility: CLINIC | Age: 60
End: 2025-07-07
Payer: COMMERCIAL

## 2025-07-07 VITALS
RESPIRATION RATE: 16 BRPM | OXYGEN SATURATION: 98 % | WEIGHT: 290 LBS | HEART RATE: 69 BPM | DIASTOLIC BLOOD PRESSURE: 59 MMHG | BODY MASS INDEX: 42.95 KG/M2 | HEIGHT: 69 IN | SYSTOLIC BLOOD PRESSURE: 93 MMHG

## 2025-07-07 PROCEDURE — 99213 OFFICE O/P EST LOW 20 MIN: CPT

## 2025-07-07 RX ORDER — TAMSULOSIN HYDROCHLORIDE 0.4 MG/1
0.4 CAPSULE ORAL
Qty: 60 | Refills: 5 | Status: ACTIVE | COMMUNITY
Start: 2025-07-07 | End: 1900-01-01

## 2025-07-14 PROBLEM — E66.01 OBESITY, MORBID, BMI 40.0-49.9: Status: ACTIVE | Noted: 2025-07-14

## 2025-08-18 ENCOUNTER — APPOINTMENT (OUTPATIENT)
Dept: BARIATRICS/WEIGHT MGMT | Facility: CLINIC | Age: 60
End: 2025-08-18
Payer: COMMERCIAL

## 2025-08-18 VITALS
SYSTOLIC BLOOD PRESSURE: 98 MMHG | TEMPERATURE: 97.5 F | HEART RATE: 71 BPM | HEIGHT: 68.5 IN | OXYGEN SATURATION: 97 % | BODY MASS INDEX: 40.75 KG/M2 | DIASTOLIC BLOOD PRESSURE: 60 MMHG | WEIGHT: 272 LBS

## 2025-08-18 DIAGNOSIS — E78.00 PURE HYPERCHOLESTEROLEMIA, UNSPECIFIED: ICD-10-CM

## 2025-08-18 DIAGNOSIS — I25.10 ATHEROSCLEROTIC HEART DISEASE OF NATIVE CORONARY ARTERY W/OUT ANGINA PECTORIS: ICD-10-CM

## 2025-08-18 DIAGNOSIS — E66.813 OBESITY, CLASS 3: ICD-10-CM

## 2025-08-18 PROCEDURE — 99205 OFFICE O/P NEW HI 60 MIN: CPT

## 2025-08-19 ENCOUNTER — TRANSCRIPTION ENCOUNTER (OUTPATIENT)
Age: 60
End: 2025-08-19

## 2025-08-19 RX ORDER — TIRZEPATIDE 15 MG/.5ML
15 INJECTION, SOLUTION SUBCUTANEOUS
Qty: 12 | Refills: 1 | Status: ACTIVE | COMMUNITY
Start: 2025-08-18 | End: 1900-01-01

## 2025-09-08 ENCOUNTER — LABORATORY RESULT (OUTPATIENT)
Age: 60
End: 2025-09-08

## 2025-09-18 ENCOUNTER — APPOINTMENT (OUTPATIENT)
Facility: CLINIC | Age: 60
End: 2025-09-18
Payer: COMMERCIAL

## 2025-09-18 DIAGNOSIS — E66.813 OBESITY, CLASS 3: ICD-10-CM

## 2025-09-18 DIAGNOSIS — E78.00 PURE HYPERCHOLESTEROLEMIA, UNSPECIFIED: ICD-10-CM

## 2025-09-18 PROCEDURE — 99213 OFFICE O/P EST LOW 20 MIN: CPT | Mod: 95
